# Patient Record
Sex: MALE | Race: WHITE | Employment: FULL TIME | URBAN - METROPOLITAN AREA
[De-identification: names, ages, dates, MRNs, and addresses within clinical notes are randomized per-mention and may not be internally consistent; named-entity substitution may affect disease eponyms.]

---

## 2017-09-11 DIAGNOSIS — J45.30 MILD PERSISTENT ASTHMA WITHOUT COMPLICATION: ICD-10-CM

## 2017-09-11 NOTE — TELEPHONE ENCOUNTER
RN spoke with patient. Follow up scheduled for 9/12/17. He will wait to have inhaler filled, in case changes are made to his medications. Closing encounter.      Rajni Goldstein RN

## 2017-09-11 NOTE — TELEPHONE ENCOUNTER
fluticasone-salmeterol (ADVAIR DISKUS) 100-50 MCG/DOSE diskus inhaler       Last Written Prescription Date: 7/19/16  Last Fill Quantity: 1, # refills: 11    Last Office Visit with [facility], [facility] or [facility] prescribing provider:  7/19/16   Future Office Visit:       Date of Last Asthma Action Plan Letter:   Asthma Action Plan Q1 Year    Topic Date Due     Asthma Action Plan - yearly  07/23/2016      Asthma Control Test:   ACT Total Scores 7/19/2016   ACT TOTAL SCORE -   ASTHMA ER VISITS -   ASTHMA HOSPITALIZATIONS -   ACT TOTAL SCORE (Goal Greater than or Equal to 20) 19   In the past 12 months, how many times did you visit the emergency room for your asthma without being admitted to the hospital? 0   In the past 12 months, how many times were you hospitalized overnight because of your asthma? 0       Date of Last Spirometry Test:   No results found for this or any previous visit.

## 2017-09-12 ENCOUNTER — OFFICE VISIT (OUTPATIENT)
Dept: ALLERGY | Facility: CLINIC | Age: 47
End: 2017-09-12
Payer: COMMERCIAL

## 2017-09-12 VITALS
OXYGEN SATURATION: 96 % | HEART RATE: 76 BPM | DIASTOLIC BLOOD PRESSURE: 80 MMHG | BODY MASS INDEX: 27.69 KG/M2 | HEIGHT: 71 IN | WEIGHT: 197.8 LBS | SYSTOLIC BLOOD PRESSURE: 119 MMHG

## 2017-09-12 DIAGNOSIS — J45.30 MILD PERSISTENT ASTHMA WITHOUT COMPLICATION: Primary | ICD-10-CM

## 2017-09-12 DIAGNOSIS — J30.89 OTHER ALLERGIC RHINITIS: ICD-10-CM

## 2017-09-12 LAB
FEF 25/75: NORMAL
FEV-1: NORMAL
FEV1/FVC: NORMAL
FVC: NORMAL

## 2017-09-12 PROCEDURE — 94010 BREATHING CAPACITY TEST: CPT | Performed by: ALLERGY & IMMUNOLOGY

## 2017-09-12 PROCEDURE — 99213 OFFICE O/P EST LOW 20 MIN: CPT | Mod: 25 | Performed by: ALLERGY & IMMUNOLOGY

## 2017-09-12 RX ORDER — ALBUTEROL SULFATE 90 UG/1
2 AEROSOL, METERED RESPIRATORY (INHALATION) EVERY 4 HOURS PRN
Qty: 1 INHALER | Refills: 3 | Status: SHIPPED | OUTPATIENT
Start: 2017-09-12 | End: 2018-11-27

## 2017-09-12 NOTE — NURSING NOTE
"Chief Complaint   Patient presents with     RECHECK     Followup and refills.        Initial /80  Pulse 76  Ht 1.81 m (5' 11.25\")  Wt 89.7 kg (197 lb 12.8 oz)  SpO2 96%  BMI 27.39 kg/m2 Estimated body mass index is 27.39 kg/(m^2) as calculated from the following:    Height as of this encounter: 1.81 m (5' 11.25\").    Weight as of this encounter: 89.7 kg (197 lb 12.8 oz).  Medication Reconciliation: complete   Dejah Frost MA.... 8:52 AM....9/12/2017      "

## 2017-09-12 NOTE — MR AVS SNAPSHOT
After Visit Summary   9/12/2017    Moreno Elena    MRN: [id number]           Patient Information     Date Of Birth          1970        Visit Information        Provider Department      9/12/2017 9:00 AM Claribel Smith MD [facility]        Today's Diagnoses     Mild persistent asthma without complication    -  1      Care Instructions    If you have any questions regarding your allergies, asthma, or what we discussed during your visit today please call the allergy clinic or contact us via [facility].    [facility]: [phone]      Follow-up in 1 year - sooner if you are having any asthma symptoms or concerns          Follow-ups after your visit        Follow-up notes from your care team     Return in about 1 year (around 9/12/2018).      Who to contact     If you have questions or need follow up information about today's clinic visit or your schedule please contact [facility] directly at [phone].  Normal or non-critical lab and imaging results will be communicated to you by [employer], letter or phone within 4 business days after the clinic has received the results. If you do not hear from us within 7 days, please contact the clinic through [employer] or phone. If you have a critical or abnormal lab result, we will notify you by phone as soon as possible.  Submit refill requests through [facility] or call your pharmacy and they will forward the refill request to us. Please allow 3 business days for your refill to be completed.          Additional Information About Your Visit        [employer] Information     [facility] gives you secure access to your electronic health record. If you see a primary care provider, you can also send messages to your care team and make appointments. If you have questions, please call your primary care clinic.  If you do not have a primary care provider, please call [phone] and they will assist you.        Care EveryWhere ID     This  "is your Care EveryWhere ID. This could be used by other organizations to access your [facility] medical records  [id number]        Your Vitals Were     Pulse Height Pulse Oximetry BMI (Body Mass Index)          76 1.81 m (5' 11.25\") 96% 27.39 kg/m2         Blood Pressure from Last 3 Encounters:   09/12/17 119/80   07/19/16 112/80   07/23/15 126/83    Weight from Last 3 Encounters:   09/12/17 89.7 kg (197 lb 12.8 oz)   07/19/16 88.9 kg (196 lb)   07/23/15 87.8 kg (193 lb 9.6 oz)              We Performed the Following     Spirometry, Breathing Capacity          Today's Medication Changes          These changes are accurate as of: 9/12/17  9:17 AM.  If you have any questions, ask your nurse or doctor.               These medicines have changed or have updated prescriptions.        Dose/Directions    albuterol 108 (90 BASE) MCG/ACT Inhaler   Commonly known as:  PROAIR HFA/PROVENTIL HFA/VENTOLIN HFA   This may have changed:  reasons to take this   Used for:  Mild persistent asthma without complication   Changed by:  Claribel Smith MD        Dose:  2 puff   Inhale 2 puffs into the lungs every 4 hours as needed (cough.)   Quantity:  1 Inhaler   Refills:  3            Where to get your medicines      These medications were sent to [place] [address] [address]     Phone:  [phone]     albuterol 108 (90 BASE) MCG/ACT Inhaler    fluticasone-salmeterol 100-50 MCG/DOSE diskus inhaler                Primary Care Provider Office Phone # Fax #    Watson Mayer [phone] [address] [facility] [address]        Equal Access to Services     MARTHA MCMILLAN : Marcos Marsh, waanny lujosefina, qaybta kaalmameche eckert, pablito pinto. So [facility] [phone].    ATENCIÓN: Si habla español, tiene a andrew disposición servicios gratuitos de asistencia lingüística. Llame al [phone].    We " comply with applicable federal civil rights laws and [place] laws. We do not discriminate on the basis of race, color, national origin, age, disability sex, sexual orientation or gender identity.            Thank you!     Thank you for choosing [facility]  for your care. Our goal is always to provide you with excellent care. Hearing back from our patients is one way we can continue to improve our services. Please take a few minutes to complete the written survey that you may receive in the mail after your visit with us. Thank you!             Your Updated Medication List - Protect others around you: Learn how to safely use, store and throw away your medicines at www.disposemymeds.org.          This list is accurate as of: 9/12/17  9:17 AM.  Always use your most recent med list.                   Brand Name Dispense Instructions for use Diagnosis    AEROCHAMBER MV Misc     1 each    Use as directed with albuterol inhaler    Mild persistent asthma without complication       albuterol 108 (90 BASE) MCG/ACT Inhaler    PROAIR HFA/PROVENTIL HFA/VENTOLIN HFA    1 Inhaler    Inhale 2 puffs into the lungs every 4 hours as needed (cough.)    Mild persistent asthma without complication       fexofenadine 180 MG tablet    ALLEGRA    90 tablet    Take 1 tablet by mouth daily.    Seasonal allergic rhinitis       fluticasone-salmeterol 100-50 MCG/DOSE diskus inhaler    ADVAIR DISKUS    3 Inhaler    Inhale 1 puff into the lungs 2 times daily    Mild persistent asthma without complication       simvastatin 40 MG tablet    ZOCOR    90 tablet    Take 1 tablet by mouth At Bedtime.    Hyperlipidemia LDL goal <160       valACYclovir 1000 mg tablet    VALTREX    20 tablet    Take  by mouth. take 2 tabs q 12 hours for 2 total doses at the onset of cold sore symptoms    Recurrent herpes labialis

## 2017-09-12 NOTE — PROGRESS NOTES
Moreno Elena was seen in the Allergy Clinic at [facility]. The following are my recommendations regarding his Mild Persistent Asthma and Allergic Rhinitis    1. Continue advair 100/50mcg, 1 puff twice daily  2. Continue albuterol HFA, 2-4 puffs every 4 hours as needed  3. Continue fexofenadine 180mg daily  4. Follow-up in 1 year, sooner if needed      Moreno Elena is a 46 year old American male who is seen today for follow-up of asthma. He reports that 2 weeks ago he ran out of his advair and has begun to notice a return of his asthma symptoms. Moreno has had to use his albuterol inhaler a couple of times in the past 2 weeks. When he is regularly taking his advair he feels his asthma is well controlled. He denies nocturnal asthma symptoms or limitations in his activity. Moreno has not had any asthma exacerbations or need for prednisone in the last year.    Moreno states that his allergy symptoms have been well controlled with daily allegra. He has no questions or concerns regarding his allergic rhinitis or asthma today.      REVIEW OF SYSTEMS:  General: negative for weight gain. negative for weight loss. negative for changes in sleep.   Eyes: positive  for itching. positive  for redness. positive  for tearing/watering.  Ears: negative for fullness. negative for hearing loss. negative for dizziness.   Nose: negative for snoring.negative for changes in smell. negative for drainage.   Throat: negative for hoarseness. negative for sore throat. negative for trouble swallowing.   Lungs: negative for shortness of breath.negative for wheezing. negative for sputum production.   Cardiovascular: negative for chest pain. negative for swelling of ankles. negative for fast or irregular heartbeat.   Gastrointestinal: negative for nausea. negative for heartburn. positive  for acid reflux.   Musculoskeletal: negative for joint pain. negative for joint stiffness. negative for joint swelling.   Neurologic: negative for  "seizures. negative for fainting. negative for weakness.   Psychiatric: negative for changes in mood. negative for anxiety.   Endocrine: negative for cold intolerance. negative for heat intolerance. negative for tremors.   Hematologic: negative for easy bruising. negative for easy bleeding.  Integumentary: negative for rash. negative for scaling. negative for nail changes.       Current Outpatient Prescriptions:      fluticasone-salmeterol (ADVAIR DISKUS) 100-50 MCG/DOSE diskus inhaler, Inhale 1 puff into the lungs 2 times daily, Disp: 1 Inhaler, Rfl: 11     albuterol (PROAIR HFA, PROVENTIL HFA, VENTOLIN HFA) 108 (90 BASE) MCG/ACT inhaler, Inhale 2 puffs into the lungs every 4 hours as needed for shortness of breath / dyspnea or wheezing (cough.), Disp: 1 Inhaler, Rfl: 3     Spacer/Aero-Holding Chambers (AEROCHAMBER MV) MISC, Use as directed with albuterol inhaler, Disp: 1 each, Rfl: 0     fexofenadine (ALLEGRA) 180 MG tablet, Take 1 tablet by mouth daily., Disp: 90 tablet, Rfl: 3     valACYclovir (VALTREX) 1000 mg tablet, Take  by mouth. take 2 tabs q 12 hours for 2 total doses at the onset of cold sore symptoms (Patient not taking: Reported on 9/12/2017), Disp: 20 tablet, Rfl: 1     simvastatin (ZOCOR) 40 MG tablet, Take 1 tablet by mouth At Bedtime. (Patient not taking: Reported on 9/12/2017), Disp: 90 tablet, Rfl: 3    EXAM:   /80  Pulse 76  Ht 1.81 m (5' 11.25\")  Wt 89.7 kg (197 lb 12.8 oz)  SpO2 96%  BMI 27.39 kg/m2  GENERAL APPEARANCE: alert, cooperative and not in distress  SKIN: no rashes, no lesions  HEAD: atraumatic, normocephalic  ENT: no scars or lesions, nasal exam showed no discharge, swelling or lesions noted, tongue midline and normal, soft palate, uvula, and tonsils normal  NECK: no asymmetry, masses, or scars, supple without significant adenopathy  LUNGS: unlabored respirations, no intercostal retractions or accessory muscle use, clear to auscultation without rales or wheezes  HEART: " regular rate and rhythm without murmurs and normal S1 and S2  MUSCULOSKELETAL: no musculoskeletal defects are noted  NEURO: no focal deficits noted  PSYCH: does not appear depressed or anxious      WORKUP:  Spirometry  SPIROMETRY       FVC 4.94L (93% of predicted).     FEV1 4.30L (104% of predicted).     FEV1/FVC 87%     FEF 25%-75%  5.16L/s (140% of predicted).    These values are consistent with normal lung function without evidence of airflow obstruction.    Asthma Control Test (ACT) total score: 20     ASSESSMENT/PLAN:  Moreno Elena is a 46 year old male here for follow-up of asthma and allergic rhinitis. He states that his symptoms have been well controlled and he has no questions or concerns today. Spirometry obtained today revealed normal lung function.    1. Continue advair 100/50mcg, 1 puff twice daily  2. Continue albuterol HFA, 2-4 puffs every 4 hours as needed  3. Continue fexofenadine 180mg daily  4. Follow-up in 1 year, sooner if needed      Claribel Smith MD  Allergy/Immunology  [place], [place]      Chart documentation done in part with Dragon Voice Recognition Software. Although reviewed after completion, some word and grammatical errors may remain.

## 2017-09-12 NOTE — PATIENT INSTRUCTIONS
If you have any questions regarding your allergies, asthma, or what we discussed during your visit today please call the allergy clinic or contact us via [employer].    [facility]: [phone]      Follow-up in 1 year - sooner if you are having any asthma symptoms or concerns

## 2017-09-13 ASSESSMENT — ASTHMA QUESTIONNAIRES: ACT_TOTALSCORE: 20

## 2018-10-04 ENCOUNTER — TELEPHONE (OUTPATIENT)
Dept: ALLERGY | Facility: CLINIC | Age: 48
End: 2018-10-04

## 2018-10-04 DIAGNOSIS — J45.30 MILD PERSISTENT ASTHMA WITHOUT COMPLICATION: ICD-10-CM

## 2018-10-04 NOTE — TELEPHONE ENCOUNTER
"Pending Prescriptions:                       Disp   Refills    fluticasone-salmeterol (ADVAIR DISKUS) 10*3 Inha*3            Sig: Inhale 1 puff into the lungs 2 times daily    Failed [facility] refill protocol, see below:    Requested Prescriptions   Pending Prescriptions Disp Refills     fluticasone-salmeterol (ADVAIR DISKUS) 100-50 MCG/DOSE diskus inhaler 3 Inhaler 3     Sig: Inhale 1 puff into the lungs 2 times daily    Inhaled Steroids Protocol Failed    10/4/2018 11:26 AM       Failed - Asthma control assessment score within normal limits in last 6 months    Please review ACT score.          Failed - Recent (6 mo) or future (30 days) visit within the authorizing provider's specialty    Patient had office visit in the last 6 months or has a visit in the next 30 days with authorizing provider or within the authorizing provider's specialty.  See \"Patient Info\" tab in inbasket, or \"Choose Columns\" in Meds & Orders section of the refill encounter.           Passed - Patient is age 12 or older        Routing refill request to provider for review/approval because:  Last ACT was 20 on 9/12/2017    Rajni Goldstein RN    "

## 2018-10-05 NOTE — TELEPHONE ENCOUNTER
Called patient and left message that prescription was called in and he will need an appointment before the next refill. Lmom to call to make appt with Dr. Simth

## 2018-11-27 ENCOUNTER — OFFICE VISIT (OUTPATIENT)
Dept: ALLERGY | Facility: CLINIC | Age: 48
End: 2018-11-27
Payer: COMMERCIAL

## 2018-11-27 VITALS
BODY MASS INDEX: 26.63 KG/M2 | TEMPERATURE: 97 F | WEIGHT: 190.2 LBS | HEIGHT: 71 IN | HEART RATE: 59 BPM | OXYGEN SATURATION: 97 % | SYSTOLIC BLOOD PRESSURE: 129 MMHG | DIASTOLIC BLOOD PRESSURE: 86 MMHG

## 2018-11-27 DIAGNOSIS — J45.30 MILD PERSISTENT ASTHMA WITHOUT COMPLICATION: Primary | ICD-10-CM

## 2018-11-27 DIAGNOSIS — Z23 NEED FOR PROPHYLACTIC VACCINATION AND INOCULATION AGAINST INFLUENZA: ICD-10-CM

## 2018-11-27 DIAGNOSIS — J30.89 OTHER ALLERGIC RHINITIS: ICD-10-CM

## 2018-11-27 LAB
FEF 25/75: NORMAL
FEV-1: NORMAL
FEV1/FVC: NORMAL
FVC: NORMAL

## 2018-11-27 PROCEDURE — 94010 BREATHING CAPACITY TEST: CPT | Performed by: ALLERGY & IMMUNOLOGY

## 2018-11-27 PROCEDURE — 90471 IMMUNIZATION ADMIN: CPT | Performed by: ALLERGY & IMMUNOLOGY

## 2018-11-27 PROCEDURE — 99213 OFFICE O/P EST LOW 20 MIN: CPT | Mod: 25 | Performed by: ALLERGY & IMMUNOLOGY

## 2018-11-27 PROCEDURE — 90682 RIV4 VACC RECOMBINANT DNA IM: CPT | Performed by: ALLERGY & IMMUNOLOGY

## 2018-11-27 RX ORDER — ALBUTEROL SULFATE 90 UG/1
2 AEROSOL, METERED RESPIRATORY (INHALATION) EVERY 4 HOURS PRN
Qty: 1 INHALER | Refills: 3 | Status: SHIPPED | OUTPATIENT
Start: 2018-11-27 | End: 2019-10-29

## 2018-11-27 NOTE — LETTER
"    11/27/2018         RE: Moreno Elena  [address] [facility] [address]        Dear Colleague,    Thank you for referring your patient, Moreno Elena, to the [facility]. Please see a copy of my visit note below.    Moreno Elena was seen in the Allergy Clinic at [facility]. The following are my recommendations regarding his Mild Persistent Asthma and Allergic Rhinitis    1. Continue advair 100/50mcg, 1 puff twice daily  2. Use albuterol HFA, 2-4 puffs every 4 hours as needed  3. Continue fexofenadine 180mg daily  4. Influenza vaccine given in clinic today  5. Follow-up in 1 year      Moreno Elena is a 48 year old American male who is seen today for follow-up of asthma and allergic rhinitis. He states that his asthma has been well controlled with his current medications. He is taking advair 100/50mcg 1 puff twice daily and uses albuterol as needed. Moreno notes that if he misses several doses of the advair he will start to have increased asthma symptoms. In general he does not need to use albuterol more than twice per week and typically uses it about once or twice per month. He uses it more in the winter time particularly if he will be exercising outdoors. Moreno denies having nocturnal asthma symptoms or limitations in activity. He has not had any exacerbations or need for prednisone in the last year.    Moreno continues to take allegra daily for his allergy symptoms and reports they have been well controlled with antihistamines.      Past Medical History:   Diagnosis Date     Allergic rhinitis due to animal dander      exercise induced asthma 2005     House dust mite allergy      Mild persistent asthma      Obstructive sleep apnea     nasal CPAP--not doing x \"yrs\"     Seasonal allergic rhinitis '95 skin tested in [place]     allergic to \"everything\" per pt.       REVIEW OF SYSTEMS:  General: negative for weight gain. negative for weight loss. negative for changes in sleep. "   Eyes: negative for itching. negative for redness. negative for tearing/watering. negative for vision changes  Ears: negative for fullness. negative for hearing loss. negative for dizziness.   Nose: negative for snoring.negative for changes in smell. negative for drainage.   Throat: negative for hoarseness. negative for sore throat. negative for trouble swallowing.   Lungs: negative for cough. negative for shortness of breath.negative for wheezing. negative for sputum production.   Cardiovascular: negative for chest pain. negative for swelling of ankles. negative for fast or irregular heartbeat.   Gastrointestinal: negative for nausea. negative for heartburn. negative for acid reflux.   Musculoskeletal: negative for joint pain. negative for joint stiffness. negative for joint swelling.   Neurologic: negative for seizures. negative for fainting. negative for weakness.   Psychiatric: negative for changes in mood. negative for anxiety.   Endocrine: negative for cold intolerance. negative for heat intolerance. negative for tremors.   Hematologic: negative for easy bruising. negative for easy bleeding.  Integumentary: negative for rash. negative for scaling. negative for nail changes.       Current Outpatient Prescriptions:      albuterol (PROAIR HFA/PROVENTIL HFA/VENTOLIN HFA) 108 (90 BASE) MCG/ACT Inhaler, Inhale 2 puffs into the lungs every 4 hours as needed (cough.), Disp: 1 Inhaler, Rfl: 3     fexofenadine (ALLEGRA) 180 MG tablet, Take 1 tablet by mouth daily., Disp: 90 tablet, Rfl: 3     fluticasone-salmeterol (ADVAIR DISKUS) 100-50 MCG/DOSE diskus inhaler, Inhale 1 puff into the lungs 2 times daily Needs appointment for further refills, Disp: 1 Inhaler, Rfl: 0     simvastatin (ZOCOR) 40 MG tablet, Take 1 tablet by mouth At Bedtime. (Patient not taking: Reported on 9/12/2017), Disp: 90 tablet, Rfl: 3     Spacer/Aero-Holding Chambers (AEROCHAMBER MV) MISC, Use as directed with albuterol inhaler, Disp: 1 each, Rfl:  "0     valACYclovir (VALTREX) 1000 mg tablet, Take  by mouth. take 2 tabs q 12 hours for 2 total doses at the onset of cold sore symptoms (Patient not taking: Reported on 9/12/2017), Disp: 20 tablet, Rfl: 1    EXAM:   /86 (BP Location: Left arm, Patient Position: Sitting, Cuff Size: Adult Regular)  Pulse 59  Temp 97  F (36.1  C) (Oral)  Ht 1.791 m (5' 10.5\")  Wt 86.3 kg (190 lb 3.2 oz)  SpO2 97%  BMI 26.91 kg/m2  GENERAL APPEARANCE: alert, cooperative and not in distress  SKIN: no rashes, no lesions  HEAD: atraumatic, normocephalic  ENT: no scars or lesions, nasal exam showed no discharge, swelling or lesions noted, tongue midline and normal, soft palate, uvula, and tonsils normal  NECK: no asymmetry, masses, or scars, supple without significant adenopathy  LUNGS: unlabored respirations, no intercostal retractions or accessory muscle use, clear to auscultation without rales or wheezes  HEART: regular rate and rhythm without murmurs and normal S1 and S2  MUSCULOSKELETAL: no musculoskeletal defects are noted  NEURO: no focal deficits noted  PSYCH: does not appear depressed or anxious      WORKUP:  Spirometry  SPIROMETRY       FVC 4.78L (93% of predicted).     FEV1 4.14L (104% of predicted).     FEV1/FVC 87%     FEF 25%-75%  5.42L/s (154% of predicted).    These values are consistent with normal lung function without evidence of airflow obstruction    Asthma Control Test (ACT) total score: 21     ASSESSMENT/PLAN:  Moreno Eelna is a 48 year old male here for follow-up of asthma and allergic rhinitis. He reports that his asthma has been well controlled with his current medication regimen. Moreno notes that he has had increased asthma symptoms if he misses several doses of the advair and is comfortable with his current dosage. His allergy symptoms have been well controlled with antihistamines. Moreno had no additional questions or concerns today.    1. Continue advair 100/50mcg, 1 puff twice daily  2. Use " albuterol HFA, 2-4 puffs every 4 hours as needed  3. Continue fexofenadine 180mg daily  4. Influenza vaccine given in clinic today  5. Follow-up in 1 year      Claribel Smith MD  Allergy/Immunology  [place], [place]      Chart documentation done in part with Dragon Voice Recognition Software. Although reviewed after completion, some word and grammatical errors may remain.        Injectable Influenza Immunization Documentation    1.  Is the person to be vaccinated sick today?   No    2. Does the person to be vaccinated have an allergy to a component   of the vaccine?   No  [employer] Allergy Algorithm Link    3. Has the person to be vaccinated ever had a serious reaction   to influenza vaccine in the past?   No    4. Has the person to be vaccinated ever had Guillain-Barré syndrome?   No    Form completed by Nely Aburto RN             Again, thank you for allowing me to participate in the care of your patient.        Sincerely,        Claribel Smith MD

## 2018-11-27 NOTE — LETTER
My Asthma Action Plan  Name: Moreno Elena   YOB: 1970  Date: 11/27/2018   My doctor: Claribel Smith MD   My clinic: [facility]        My Control Medicine: Fluticasone + salmeterol (Advair) -  Diskus 100/50 mcg 1 puff twice daily  My Rescue Medicine: Albuterol (Proair/Ventolin/Proventil) inhaler 2-4 puffs every 4 hours as needed   My Asthma Severity: mild persistent  Avoid your asthma triggers: exercise or sports and cold air               GREEN ZONE   Good Control    I feel good    No cough or wheeze    Can work, sleep and play without asthma symptoms       Take your asthma control medicine every day.     1. If exercise triggers your asthma, take your rescue medication    15 minutes before exercise or sports, and    During exercise if you have asthma symptoms  2. Spacer to use with inhaler: If you have a spacer, make sure to use it with your inhaler             YELLOW ZONE Getting Worse  I have ANY of these:    I do not feel good    Cough or wheeze    Chest feels tight    Wake up at night   1. Keep taking your Green Zone medications  2. Start taking your rescue medicine:    every 20 minutes for up to 1 hour. Then every 4 hours for 24-48 hours.  3. If you stay in the Yellow Zone for more than 12-24 hours, contact your doctor.           RED ZONE Medical Alert - Get Help  I have ANY of these:    I feel awful    Medicine is not helping    Breathing getting harder    Trouble walking or talking    Nose opens wide to breathe       1. Take your rescue medicine NOW  2. If your provider has prescribed an oral steroid medicine, start taking it NOW  3. Call your doctor NOW  4. If you are still in the Red Zone after 20 minutes and you have not reached your doctor:    Take your rescue medicine again and    Call 911 or go to the emergency room right away    See your regular doctor within 2 weeks of an Emergency Room or Urgent Care visit for follow-up treatment.          Annual Reminders:  Meet with  Asthma Educator,  Flu Shot in the Fall, consider Pneumonia Vaccination for patients with asthma (aged 19 and older).    Pharmacy:    [facility] - [place], [place] - [address] [facility]  [facility] - [place], [place] - [address] [facility]  WRITTEN PRESCRIPTION REQUESTED                      Asthma Triggers  How To Control Things That Make Your Asthma Worse    Triggers are things that make your asthma worse.  Look at the list below to help you find your triggers and what you can do about them.  You can help prevent asthma flare-ups by staying away from your triggers.      Trigger                                                          What you can do   Cigarette Smoke  Tobacco smoke can make asthma worse. Do not allow smoking in your home, car or around you.  Be sure no one smokes at a child s day care or school.  If you smoke, ask your health care provider for ways to help you quit.  Ask family members to quit too.  Ask your health care provider for a referral to Quit Plan to help you quit smoking, or call 2-016-678-PLAN.     Colds, Flu, Bronchitis  These are common triggers of asthma. Wash your hands often.  Don t touch your eyes, nose or mouth.  Get a flu shot every year.     Dust Mites  These are tiny bugs that live in cloth or carpet. They are too small to see. Wash sheets and blankets in hot water every week.   Encase pillows and mattress in dust mite proof covers.  Avoid having carpet if you can. If you have carpet, vacuum weekly.   Use a dust mask and HEPA vacuum.   Pollen and Outdoor Mold  Some people are allergic to trees, grass, or weed pollen, or molds. Try to keep your windows closed.  Limit time out doors when pollen count is high.   Ask you health care provider about taking medicine during allergy season.     Animal Dander  Some people are allergic to skin flakes, urine or saliva from pets with fur or feathers. Keep pets with fur or feathers out of your home.    If you  can t keep the pet outdoors, then keep the pet out of your bedroom.  Keep the bedroom door closed.  Keep pets off cloth furniture and away from stuffed toys.     Mice, Rats, and Cockroaches  Some people are allergic to the waste from these pests.   Cover food and garbage.  Clean up spills and food crumbs.  Store grease in the refrigerator.   Keep food out of the bedroom.   Indoor Mold  This can be a trigger if your home has high moisture. Fix leaking faucets, pipes, or other sources of water.   Clean moldy surfaces.  Dehumidify basement if it is damp and smelly.   Smoke, Strong Odors, and Sprays  These can reduce air quality. Stay away from strong odors and sprays, such as perfume, powder, hair spray, paints, smoke incense, paint, cleaning products, candles and new carpet.   Exercise or Sports  Some people with asthma have this trigger. Be active!  Ask your doctor about taking medicine before sports or exercise to prevent symptoms.    Warm up for 5-10 minutes before and after sports or exercise.     Other Triggers of Asthma  Cold air:  Cover your nose and mouth with a scarf.  Sometimes laughing or crying can be a trigger.  Some medicines and food can trigger asthma.

## 2018-11-27 NOTE — MR AVS SNAPSHOT
"              After Visit Summary   11/27/2018    Moreno Elena    MRN: [id number]           Patient Information     Date Of Birth          1970        Visit Information        Provider Department      11/27/2018 4:20 PM Claribel Smith MD [facility]        Today's Diagnoses     Mild persistent asthma without complication    -  1    Other allergic rhinitis        Need for prophylactic vaccination and inoculation against influenza           Follow-ups after your visit        Who to contact     If you have questions or need follow up information about today's clinic visit or your schedule please contact [facility] directly at [phone].  Normal or non-critical lab and imaging results will be communicated to you by Metasethart, letter or phone within 4 business days after the clinic has received the results. If you do not hear from us within 7 days, please contact the clinic through Metasethart or phone. If you have a critical or abnormal lab result, we will notify you by phone as soon as possible.  Submit refill requests through [email or web address] or call your pharmacy and they will forward the refill request to us. Please allow 3 business days for your refill to be completed.          Additional Information About Your Visit        MyChart Information     [email or web address] gives you secure access to your electronic health record. If you see a primary care provider, you can also send messages to your care team and make appointments. If you have questions, please call your primary care clinic.  If you do not have a primary care provider, please call [phone] and they will assist you.        Care EveryWhere ID     This is your Care EveryWhere ID. This could be used by other organizations to access your [facility] medical records  [id number]        Your Vitals Were     Pulse Temperature Height Pulse Oximetry BMI (Body Mass Index)       59 97  F (36.1  C) (Oral) 1.791 m (5' 10.5\") 97% 26.91 kg/m2        Blood " Pressure from Last 3 Encounters:   11/27/18 129/86   09/12/17 119/80   07/19/16 112/80    Weight from Last 3 Encounters:   11/27/18 86.3 kg (190 lb 3.2 oz)   09/12/17 89.7 kg (197 lb 12.8 oz)   07/19/16 88.9 kg (196 lb)              We Performed the Following     FLU VACCINE, (RIV4) RECOMBINANT HA  , IM (FluBlok, egg free) [85670]- >18 YRS ([facility] recommended  50-64 YRS)     Spirometry, Breathing Capacity          Today's Medication Changes          These changes are accurate as of 11/27/18  4:48 PM.  If you have any questions, ask your nurse or doctor.               These medicines have changed or have updated prescriptions.        Dose/Directions    albuterol 108 (90 Base) MCG/ACT inhaler   Commonly known as:  PROAIR HFA/PROVENTIL HFA/VENTOLIN HFA   This may have changed:  reasons to take this   Used for:  Mild persistent asthma without complication   Changed by:  Claribel Smith MD        Dose:  2 puff   Inhale 2 puffs into the lungs every 4 hours as needed for shortness of breath / dyspnea or wheezing (cough.)   Quantity:  1 Inhaler   Refills:  3         Stop taking these medicines if you haven't already. Please contact your care team if you have questions.     simvastatin 40 MG tablet   Commonly known as:  ZOCOR   Stopped by:  Claribel Smith MD                Where to get your medicines      These medications were sent to [address] [address]     Phone:  [phone]     albuterol 108 (90 Base) MCG/ACT inhaler    fluticasone-salmeterol 100-50 MCG/DOSE inhaler                Primary Care Provider Office Phone # Fax #    Watson Mayer [phone] [address] [facility] [address]        Equal Access to Services     [facility] DEYANIRA : Marcos Marsh, wapromiseda luqadaha, qaybta kaalpablito betancourt. [facility] [phone].    ATENCIÓN: Si mireya lin  a andrew disposición servicios gratuitos de asistencia lingüística. Kike march [phone].    We comply with applicable federal civil rights laws and [place] laws. We do not discriminate on the basis of race, color, national origin, age, disability, sex, sexual orientation, or gender identity.            Thank you!     Thank you for choosing [facility]  for your care. Our goal is always to provide you with excellent care. Hearing back from our patients is one way we can continue to improve our services. Please take a few minutes to complete the written survey that you may receive in the mail after your visit with us. Thank you!             Your Updated Medication List - Protect others around you: Learn how to safely use, store and throw away your medicines at www.disposemymeds.org.          This list is accurate as of 11/27/18  4:48 PM.  Always use your most recent med list.                   Brand Name Dispense Instructions for use Diagnosis    AEROCHAMBER MV Misc     1 each    Use as directed with albuterol inhaler    Mild persistent asthma without complication       albuterol 108 (90 Base) MCG/ACT inhaler    PROAIR HFA/PROVENTIL HFA/VENTOLIN HFA    1 Inhaler    Inhale 2 puffs into the lungs every 4 hours as needed for shortness of breath / dyspnea or wheezing (cough.)    Mild persistent asthma without complication       fexofenadine 180 MG tablet    ALLEGRA    90 tablet    Take 1 tablet by mouth daily.    Seasonal allergic rhinitis       fluticasone-salmeterol 100-50 MCG/DOSE inhaler    ADVAIR DISKUS    1 Inhaler    Inhale 1 puff into the lungs 2 times daily Needs appointment for further refills    Mild persistent asthma without complication       valACYclovir 1000 mg tablet    VALTREX    20 tablet    Take  by mouth. take 2 tabs q 12 hours for 2 total doses at the onset of cold sore symptoms    Recurrent herpes labialis

## 2018-11-27 NOTE — PROGRESS NOTES

## 2018-11-27 NOTE — PROGRESS NOTES
"Moreno Elena was seen in the Allergy Clinic at [facility]. The following are my recommendations regarding his Mild Persistent Asthma and Allergic Rhinitis    1. Continue advair 100/50mcg, 1 puff twice daily  2. Use albuterol HFA, 2-4 puffs every 4 hours as needed  3. Continue fexofenadine 180mg daily  4. Influenza vaccine given in clinic today  5. Follow-up in 1 year      Moreno Elena is a 48 year old American male who is seen today for follow-up of asthma and allergic rhinitis. He states that his asthma has been well controlled with his current medications. He is taking advair 100/50mcg 1 puff twice daily and uses albuterol as needed. Moreno notes that if he misses several doses of the advair he will start to have increased asthma symptoms. In general he does not need to use albuterol more than twice per week and typically uses it about once or twice per month. He uses it more in the winter time particularly if he will be exercising outdoors. Moreno denies having nocturnal asthma symptoms or limitations in activity. He has not had any exacerbations or need for prednisone in the last year.    Moreno continues to take allegra daily for his allergy symptoms and reports they have been well controlled with antihistamines.      Past Medical History:   Diagnosis Date     Allergic rhinitis due to animal dander      exercise induced asthma 2005     House dust mite allergy      Mild persistent asthma      Obstructive sleep apnea     nasal CPAP--not doing x \"yrs\"     Seasonal allergic rhinitis '95 skin tested in [place]     allergic to \"everything\" per pt.       REVIEW OF SYSTEMS:  General: negative for weight gain. negative for weight loss. negative for changes in sleep.   Eyes: negative for itching. negative for redness. negative for tearing/watering. negative for vision changes  Ears: negative for fullness. negative for hearing loss. negative for dizziness.   Nose: negative for snoring.negative for changes in " smell. negative for drainage.   Throat: negative for hoarseness. negative for sore throat. negative for trouble swallowing.   Lungs: negative for cough. negative for shortness of breath.negative for wheezing. negative for sputum production.   Cardiovascular: negative for chest pain. negative for swelling of ankles. negative for fast or irregular heartbeat.   Gastrointestinal: negative for nausea. negative for heartburn. negative for acid reflux.   Musculoskeletal: negative for joint pain. negative for joint stiffness. negative for joint swelling.   Neurologic: negative for seizures. negative for fainting. negative for weakness.   Psychiatric: negative for changes in mood. negative for anxiety.   Endocrine: negative for cold intolerance. negative for heat intolerance. negative for tremors.   Hematologic: negative for easy bruising. negative for easy bleeding.  Integumentary: negative for rash. negative for scaling. negative for nail changes.       Current Outpatient Prescriptions:      albuterol (PROAIR HFA/PROVENTIL HFA/VENTOLIN HFA) 108 (90 BASE) MCG/ACT Inhaler, Inhale 2 puffs into the lungs every 4 hours as needed (cough.), Disp: 1 Inhaler, Rfl: 3     fexofenadine (ALLEGRA) 180 MG tablet, Take 1 tablet by mouth daily., Disp: 90 tablet, Rfl: 3     fluticasone-salmeterol (ADVAIR DISKUS) 100-50 MCG/DOSE diskus inhaler, Inhale 1 puff into the lungs 2 times daily Needs appointment for further refills, Disp: 1 Inhaler, Rfl: 0     simvastatin (ZOCOR) 40 MG tablet, Take 1 tablet by mouth At Bedtime. (Patient not taking: Reported on 9/12/2017), Disp: 90 tablet, Rfl: 3     Spacer/Aero-Holding Chambers (AEROCHAMBER MV) MISC, Use as directed with albuterol inhaler, Disp: 1 each, Rfl: 0     valACYclovir (VALTREX) 1000 mg tablet, Take  by mouth. take 2 tabs q 12 hours for 2 total doses at the onset of cold sore symptoms (Patient not taking: Reported on 9/12/2017), Disp: 20 tablet, Rfl: 1    EXAM:   /86 (BP Location: Left  "arm, Patient Position: Sitting, Cuff Size: Adult Regular)  Pulse 59  Temp 97  F (36.1  C) (Oral)  Ht 1.791 m (5' 10.5\")  Wt 86.3 kg (190 lb 3.2 oz)  SpO2 97%  BMI 26.91 kg/m2  GENERAL APPEARANCE: alert, cooperative and not in distress  SKIN: no rashes, no lesions  HEAD: atraumatic, normocephalic  ENT: no scars or lesions, nasal exam showed no discharge, swelling or lesions noted, tongue midline and normal, soft palate, uvula, and tonsils normal  NECK: no asymmetry, masses, or scars, supple without significant adenopathy  LUNGS: unlabored respirations, no intercostal retractions or accessory muscle use, clear to auscultation without rales or wheezes  HEART: regular rate and rhythm without murmurs and normal S1 and S2  MUSCULOSKELETAL: no musculoskeletal defects are noted  NEURO: no focal deficits noted  PSYCH: does not appear depressed or anxious      WORKUP:  Spirometry  SPIROMETRY       FVC 4.78L (93% of predicted).     FEV1 4.14L (104% of predicted).     FEV1/FVC 87%     FEF 25%-75%  5.42L/s (154% of predicted).    These values are consistent with normal lung function without evidence of airflow obstruction    Asthma Control Test (ACT) total score: 21     ASSESSMENT/PLAN:  Moreno Elena is a 48 year old male here for follow-up of asthma and allergic rhinitis. He reports that his asthma has been well controlled with his current medication regimen. Moreno notes that he has had increased asthma symptoms if he misses several doses of the advair and is comfortable with his current dosage. His allergy symptoms have been well controlled with antihistamines. Moreno had no additional questions or concerns today.    1. Continue advair 100/50mcg, 1 puff twice daily  2. Use albuterol HFA, 2-4 puffs every 4 hours as needed  3. Continue fexofenadine 180mg daily  4. Influenza vaccine given in clinic today  5. Follow-up in 1 year      Claribel Smith MD  Allergy/Immunology  [facility] and [place], [place]      Chart " documentation done in part with Dragon Voice Recognition Software. Although reviewed after completion, some word and grammatical errors may remain.

## 2018-11-28 ASSESSMENT — ASTHMA QUESTIONNAIRES: ACT_TOTALSCORE: 21

## 2019-10-29 ENCOUNTER — OFFICE VISIT (OUTPATIENT)
Dept: ALLERGY | Facility: CLINIC | Age: 49
End: 2019-10-29
Payer: COMMERCIAL

## 2019-10-29 VITALS
HEART RATE: 50 BPM | DIASTOLIC BLOOD PRESSURE: 85 MMHG | SYSTOLIC BLOOD PRESSURE: 124 MMHG | BODY MASS INDEX: 25.21 KG/M2 | WEIGHT: 178.2 LBS | TEMPERATURE: 97.5 F

## 2019-10-29 DIAGNOSIS — Z23 NEED FOR VACCINATION: ICD-10-CM

## 2019-10-29 DIAGNOSIS — J45.30 MILD PERSISTENT ASTHMA WITHOUT COMPLICATION: Primary | ICD-10-CM

## 2019-10-29 DIAGNOSIS — J30.89 OTHER ALLERGIC RHINITIS: ICD-10-CM

## 2019-10-29 LAB
FEF 25/75: NORMAL
FEV-1: NORMAL
FEV1/FVC: NORMAL
FVC: NORMAL

## 2019-10-29 PROCEDURE — 94010 BREATHING CAPACITY TEST: CPT | Performed by: ALLERGY & IMMUNOLOGY

## 2019-10-29 PROCEDURE — 90732 PPSV23 VACC 2 YRS+ SUBQ/IM: CPT | Performed by: ALLERGY & IMMUNOLOGY

## 2019-10-29 PROCEDURE — 90471 IMMUNIZATION ADMIN: CPT | Performed by: ALLERGY & IMMUNOLOGY

## 2019-10-29 PROCEDURE — 99214 OFFICE O/P EST MOD 30 MIN: CPT | Mod: 25 | Performed by: ALLERGY & IMMUNOLOGY

## 2019-10-29 RX ORDER — ALBUTEROL SULFATE 90 UG/1
2 AEROSOL, METERED RESPIRATORY (INHALATION) EVERY 4 HOURS PRN
Qty: 1 INHALER | Refills: 3 | Status: SHIPPED | OUTPATIENT
Start: 2019-10-29 | End: 2020-11-02

## 2019-10-29 NOTE — LETTER
My Asthma Action Plan    Name: Moreno Elena   YOB: 1970  Date: 10/29/2019   My doctor: Claribel Smith MD   My clinic: [facility]        My Control Medicine: Fluticasone propionate + salmeterol (Advair Diskus or Wixela Inhub) -  100/50 mcg 1 puff twice daily  My Rescue Medicine: Albuterol (Proair/Ventolin/Proventil HFA) 2-4 puffs EVERY 4 HOURS as needed. Use a spacer if recommended by your provider.   My Asthma Severity:   Mild Persistent  Know your asthma triggers: exercise or sports and cold air               GREEN ZONE   Good Control    I feel good    No cough or wheeze    Can work, sleep and play without asthma symptoms       Take your asthma control medicine every day.     1. If exercise triggers your asthma, take your rescue medication    15 minutes before exercise or sports, and    During exercise if you have asthma symptoms  2. Spacer to use with inhaler: If you have a spacer, make sure to use it with your inhaler             YELLOW ZONE Getting Worse  I have ANY of these:    I do not feel good    Cough or wheeze    Chest feels tight    Wake up at night   1. Keep taking your Green Zone medications  2. Start taking your rescue medicine:    every 20 minutes for up to 1 hour. Then every 4 hours for 24-48 hours.  3. If you stay in the Yellow Zone for more than 12-24 hours, contact your doctor.  4. If you do not return to the Green Zone in 12-24 hours or you get worse, start taking your oral steroid medicine if prescribed by your provider.           RED ZONE Medical Alert - Get Help  I have ANY of these:    I feel awful    Medicine is not helping    Breathing getting harder    Trouble walking or talking    Nose opens wide to breathe       1. Take your rescue medicine NOW  2. If your provider has prescribed an oral steroid medicine, start taking it NOW  3. Call your doctor NOW  4. If you are still in the Red Zone after 20 minutes and you have not reached your doctor:    Take your  rescue medicine again and    Call 911 or go to the emergency room right away    See your regular doctor within 2 weeks of an Emergency Room or Urgent Care visit for follow-up treatment.          Annual Reminders:  Meet with Asthma Educator,  Flu Shot in the Fall, consider Pneumonia Vaccination for patients with asthma (aged 19 and older).    Pharmacy:    [facility] - [facility] [address] [facility].   [facility] - [place], [place] - [facility] [facility]  WRITTEN PRESCRIPTION REQUESTED                          Asthma Triggers  How To Control Things That Make Your Asthma Worse    Triggers are things that make your asthma worse.  Look at the list below to help you find your triggers and what you can do about them.  You can help prevent asthma flare-ups by staying away from your triggers.      Trigger                                                          What you can do   Cigarette Smoke  Tobacco smoke can make asthma worse. Do not allow smoking in your home, car or around you.  Be sure no one smokes at a child s day care or school.  If you smoke, ask your health care provider for ways to help you quit.  Ask family members to quit too.  Ask your health care provider for a referral to Quit Plan to help you quit smoking, or call 7-480-422-PLAN.     Colds, Flu, Bronchitis  These are common triggers of asthma. Wash your hands often.  Don t touch your eyes, nose or mouth.  Get a flu shot every year.     Dust Mites  These are tiny bugs that live in cloth or carpet. They are too small to see. Wash sheets and blankets in hot water every week.   Encase pillows and mattress in dust mite proof covers.  Avoid having carpet if you can. If you have carpet, vacuum weekly.   Use a dust mask and HEPA vacuum.   Pollen and Outdoor Mold  Some people are allergic to trees, grass, or weed pollen, or molds. Try to keep your windows closed.  Limit time out doors when pollen count is high.   Ask you  health care provider about taking medicine during allergy season.     Animal Dander  Some people are allergic to skin flakes, urine or saliva from pets with fur or feathers. Keep pets with fur or feathers out of your home.    If you can t keep the pet outdoors, then keep the pet out of your bedroom.  Keep the bedroom door closed.  Keep pets off cloth furniture and away from stuffed toys.     Mice, Rats, and Cockroaches   Some people are allergic to the waste from these pests.   Cover food and garbage.  Clean up spills and food crumbs.  Store grease in the refrigerator.   Keep food out of the bedroom.   Indoor Mold  This can be a trigger if your home has high moisture. Fix leaking faucets, pipes, or other sources of water.   Clean moldy surfaces.  Dehumidify basement if it is damp and smelly.   Smoke, Strong Odors, and Sprays  These can reduce air quality. Stay away from strong odors and sprays, such as perfume, powder, hair spray, paints, smoke incense, paint, cleaning products, candles and new carpet.   Exercise or Sports  Some people with asthma have this trigger. Be active!  Ask your doctor about taking medicine before sports or exercise to prevent symptoms.    Warm up for 5-10 minutes before and after sports or exercise.     Other Triggers of Asthma  Cold air:  Cover your nose and mouth with a scarf.  Sometimes laughing or crying can be a trigger.  Some medicines and food can trigger asthma.

## 2019-10-29 NOTE — NURSING NOTE
Prior to immunization administration, verified patients identity using patient s name and date of birth. Please see Immunization Activity for additional information.     Screening Questionnaire for Adult Immunization    Are you sick today?   No   Do you have allergies to medications, food, a vaccine component or latex?   No   Have you ever had a serious reaction after receiving a vaccination?   No   Do you have a long-term health problem with heart disease, lung disease, asthma, kidney disease, metabolic disease (e.g. diabetes), anemia, or other blood disorder?   Yes   Do you have cancer, leukemia, HIV/AIDS, or any other immune system problem?   No   In the past 3 months, have you taken medications that affect  your immune system, such as prednisone, other steroids, or anticancer drugs; drugs for the treatment of rheumatoid arthritis, Crohn s disease, or psoriasis; or have you had radiation treatments?   No   Have you had a seizure, or a brain or other nervous system problem?   No   During the past year, have you received a transfusion of blood or blood     products, or been given immune (gamma) globulin or antiviral drug?   No   For women: Are you pregnant or is there a chance you could become        pregnant during the next month?   No   Have you received any vaccinations in the past 4 weeks?   No     Immunization questionnaire was positive for at least one answer.  Notified Dr. Smith.        Per orders of Dr. Smith, injection of Pneumovax 23 given by Sally Villela CMA. Patient instructed to remain in clinic for 15 minutes afterwards, and to report any adverse reaction to me immediately.       Screening performed by Sally Villela CMA on 10/29/2019 at 11:11 AM.

## 2019-10-29 NOTE — LETTER
"    10/29/2019         RE: Moreno Elena  [address] [id number]        Dear Colleague,    Thank you for referring your patient, Moreno Elena, to the [facility]. Please see a copy of my visit note below.    Moreno Elena was seen in the Allergy Clinic at [facility]. The following are my recommendations regarding his Mild Persistent Asthma and Allergic Rhinitis    1. Continue Advair 100/50mcg, 1 puff twice daily  2. Take 2 to 4 puffs of albuterol HFA every 4 hours as needed. May also take 2 puffs 15 minutes prior to exercise.  3. Continue fexofenadine 180mg daily  4. Pneumococcal vaccine given in clinic today  5. Follow-up in 1 year      Moreno Elena is a 48 year old American male who is seen today for follow-up of asthma and allergic rhinitis. He states that he has been doing well and has had no issues with his asthma over the past year. He continues to take Advair though often only takes it once daily. He has noticed if he stops the Advair altogether that his asthma symptoms will return. Moreno has rarely needed to use his albuterol inhaler and denies having nocturnal asthma symptoms or limitations in his activity. He does pre-medicate with albuterol prior to exercise, particularly if exercising outdoors in the winter.     Moreno continues to take allegra daily for rhinitis symptoms. He feels his symptoms have been well controlled with this regimen.      Past Medical History:   Diagnosis Date     Allergic rhinitis due to animal dander      exercise induced asthma 2005     House dust mite allergy      Mild persistent asthma      Obstructive sleep apnea     nasal CPAP--not doing x \"yrs\"     Seasonal allergic rhinitis '95 skin tested in [place]     allergic to \"everything\" per pt.     Family History   Problem Relation Age of Onset     Cancer Father         throat     Social History     Tobacco Use     Smoking status: Never Smoker     Smokeless tobacco: Never Used "   Substance Use Topics     Alcohol use: Yes     Comment: rarely     Drug use: No       Past medical, family, and social history were reviewed.    REVIEW OF SYSTEMS:  General: negative for weight gain. negative for weight loss. negative for changes in sleep.   Eyes: negative for itching. negative for redness. negative for tearing/watering. negative for vision changes  Ears: negative for fullness. negative for hearing loss. negative for dizziness.   Nose: negative for snoring.negative for changes in smell. negative for drainage.   Throat: negative for hoarseness. negative for sore throat. negative for trouble swallowing.   Lungs: negative for cough. negative for shortness of breath.negative for wheezing. negative for sputum production.   Cardiovascular: negative for chest pain. negative for swelling of ankles. negative for fast or irregular heartbeat.   Gastrointestinal: negative for nausea. negative for heartburn. negative for acid reflux.   Musculoskeletal: negative for joint pain. negative for joint stiffness. negative for joint swelling.   Neurologic: negative for seizures. negative for fainting. negative for weakness.   Psychiatric: negative for changes in mood. negative for anxiety.   Endocrine: negative for cold intolerance. negative for heat intolerance. negative for tremors.   Hematologic: negative for easy bruising. negative for easy bleeding.  Integumentary: negative for rash. negative for scaling. negative for nail changes.       Current Outpatient Medications:      albuterol (PROAIR HFA/PROVENTIL HFA/VENTOLIN HFA) 108 (90 Base) MCG/ACT inhaler, Inhale 2 puffs into the lungs every 4 hours as needed for shortness of breath / dyspnea or wheezing (cough.), Disp: 1 Inhaler, Rfl: 3     fexofenadine (ALLEGRA) 180 MG tablet, Take 1 tablet by mouth daily., Disp: 90 tablet, Rfl: 3     fluticasone-salmeterol (ADVAIR DISKUS) 100-50 MCG/DOSE inhaler, Inhale 1 puff into the lungs 2 times daily Needs appointment for  further refills, Disp: 1 Inhaler, Rfl: 11     Spacer/Aero-Holding Chambers (AEROCHAMBER MV) MISC, Use as directed with albuterol inhaler, Disp: 1 each, Rfl: 0     valACYclovir (VALTREX) 1000 mg tablet, Take  by mouth. take 2 tabs q 12 hours for 2 total doses at the onset of cold sore symptoms (Patient not taking: Reported on 9/12/2017), Disp: 20 tablet, Rfl: 1    EXAM:   /85 (BP Location: Left arm, Patient Position: Sitting, Cuff Size: Adult Regular)   Pulse 50   Temp 97.5  F (36.4  C) (Oral)   Wt 80.8 kg (178 lb 3.2 oz)   BMI 25.21 kg/m     GENERAL APPEARANCE: alert, cooperative and not in distress  SKIN: no rashes, no lesions  HEAD: atraumatic, normocephalic  EYES: lids and lashes normal, conjunctivae and sclerae clear  ENT: no scars or lesions, tongue midline and normal, soft palate, uvula, and tonsils normal  NECK: no asymmetry, masses, or scars, supple without significant adenopathy  LUNGS: unlabored respirations, no intercostal retractions or accessory muscle use, clear to auscultation without rales or wheezes  HEART: regular rate and rhythm without murmurs and normal S1 and S2  MUSCULOSKELETAL: no musculoskeletal defects are noted  NEURO: no focal deficits noted  PSYCH: does not appear depressed or anxious      WORKUP:  Spirometry    SPIROMETRY       FVC 4.78L (93% of predicted).     FEV1 4.18L (105% of predicted).     FEV1/FVC 87%      I have reviewed and interpreted these results. These values and flow volume loop are consistent with normal lung function    Asthma Control Test (ACT) total score: 23     ASSESSMENT/PLAN:  Moreno Elena is a 48 year old male here for follow-up of asthma and allergic rhinitis. His asthma has been well controlled with daily Advair and he has had no exacerbations or need for prednisone in the past year. When attempting to wean off of Advair or step-down therapy in the past his asthma symptoms have returned and Moreno wishes to continue with his current medication  regimen.    1. Continue Advair 100/50mcg, 1 puff twice daily  2. Take 2 to 4 puffs of albuterol HFA every 4 hours as needed. May also take 2 puffs 15 minutes prior to exercise.  3. Continue fexofenadine 180mg daily  4. Pneumococcal vaccine given in clinic today  5. Follow-up in 1 year      Thank you for allowing me to participate in the care of Moreno Elena.      Claribel Smith MD  Allergy/Immunology  [place], [place]      Chart documentation done in part with Dragon Voice Recognition Software. Although reviewed after completion, some word and grammatical errors may remain.    Again, thank you for allowing me to participate in the care of your patient.        Sincerely,        Claribel Smith MD

## 2019-10-29 NOTE — PROGRESS NOTES
"Moreno Elena was seen in the Allergy Clinic at [facility]. The following are my recommendations regarding his Mild Persistent Asthma and Allergic Rhinitis    1. Continue Advair 100/50mcg, 1 puff twice daily  2. Take 2 to 4 puffs of albuterol HFA every 4 hours as needed. May also take 2 puffs 15 minutes prior to exercise.  3. Continue fexofenadine 180mg daily  4. Pneumococcal vaccine given in clinic today  5. Follow-up in 1 year      Moreno Elena is a 48 year old American male who is seen today for follow-up of asthma and allergic rhinitis. He states that he has been doing well and has had no issues with his asthma over the past year. He continues to take Advair though often only takes it once daily. He has noticed if he stops the Advair altogether that his asthma symptoms will return. Moreno has rarely needed to use his albuterol inhaler and denies having nocturnal asthma symptoms or limitations in his activity. He does pre-medicate with albuterol prior to exercise, particularly if exercising outdoors in the winter.     Moreno continues to take allegra daily for rhinitis symptoms. He feels his symptoms have been well controlled with this regimen.      Past Medical History:   Diagnosis Date     Allergic rhinitis due to animal dander      exercise induced asthma 2005     House dust mite allergy      Mild persistent asthma      Obstructive sleep apnea     nasal CPAP--not doing x \"yrs\"     Seasonal allergic rhinitis '95 skin tested in [place]     allergic to \"everything\" per pt.     Family History   Problem Relation Age of Onset     Cancer Father         throat     Social History     Tobacco Use     Smoking status: Never Smoker     Smokeless tobacco: Never Used   Substance Use Topics     Alcohol use: Yes     Comment: rarely     Drug use: No       Past medical, family, and social history were reviewed.    REVIEW OF SYSTEMS:  General: negative for weight gain. negative for weight loss. negative for changes in " sleep.   Eyes: negative for itching. negative for redness. negative for tearing/watering. negative for vision changes  Ears: negative for fullness. negative for hearing loss. negative for dizziness.   Nose: negative for snoring.negative for changes in smell. negative for drainage.   Throat: negative for hoarseness. negative for sore throat. negative for trouble swallowing.   Lungs: negative for cough. negative for shortness of breath.negative for wheezing. negative for sputum production.   Cardiovascular: negative for chest pain. negative for swelling of ankles. negative for fast or irregular heartbeat.   Gastrointestinal: negative for nausea. negative for heartburn. negative for acid reflux.   Musculoskeletal: negative for joint pain. negative for joint stiffness. negative for joint swelling.   Neurologic: negative for seizures. negative for fainting. negative for weakness.   Psychiatric: negative for changes in mood. negative for anxiety.   Endocrine: negative for cold intolerance. negative for heat intolerance. negative for tremors.   Hematologic: negative for easy bruising. negative for easy bleeding.  Integumentary: negative for rash. negative for scaling. negative for nail changes.       Current Outpatient Medications:      albuterol (PROAIR HFA/PROVENTIL HFA/VENTOLIN HFA) 108 (90 Base) MCG/ACT inhaler, Inhale 2 puffs into the lungs every 4 hours as needed for shortness of breath / dyspnea or wheezing (cough.), Disp: 1 Inhaler, Rfl: 3     fexofenadine (ALLEGRA) 180 MG tablet, Take 1 tablet by mouth daily., Disp: 90 tablet, Rfl: 3     fluticasone-salmeterol (ADVAIR DISKUS) 100-50 MCG/DOSE inhaler, Inhale 1 puff into the lungs 2 times daily Needs appointment for further refills, Disp: 1 Inhaler, Rfl: 11     Spacer/Aero-Holding Chambers (AEROCHAMBER MV) MISC, Use as directed with albuterol inhaler, Disp: 1 each, Rfl: 0     valACYclovir (VALTREX) 1000 mg tablet, Take  by mouth. take 2 tabs q 12 hours for 2 total  doses at the onset of cold sore symptoms (Patient not taking: Reported on 9/12/2017), Disp: 20 tablet, Rfl: 1    EXAM:   /85 (BP Location: Left arm, Patient Position: Sitting, Cuff Size: Adult Regular)   Pulse 50   Temp 97.5  F (36.4  C) (Oral)   Wt 80.8 kg (178 lb 3.2 oz)   BMI 25.21 kg/m    GENERAL APPEARANCE: alert, cooperative and not in distress  SKIN: no rashes, no lesions  HEAD: atraumatic, normocephalic  EYES: lids and lashes normal, conjunctivae and sclerae clear  ENT: no scars or lesions, tongue midline and normal, soft palate, uvula, and tonsils normal  NECK: no asymmetry, masses, or scars, supple without significant adenopathy  LUNGS: unlabored respirations, no intercostal retractions or accessory muscle use, clear to auscultation without rales or wheezes  HEART: regular rate and rhythm without murmurs and normal S1 and S2  MUSCULOSKELETAL: no musculoskeletal defects are noted  NEURO: no focal deficits noted  PSYCH: does not appear depressed or anxious      WORKUP:  Spirometry    SPIROMETRY       FVC 4.78L (93% of predicted).     FEV1 4.18L (105% of predicted).     FEV1/FVC 87%      I have reviewed and interpreted these results. These values and flow volume loop are consistent with normal lung function    Asthma Control Test (ACT) total score: 23     ASSESSMENT/PLAN:  Moreno Elena is a 48 year old male here for follow-up of asthma and allergic rhinitis. His asthma has been well controlled with daily Advair and he has had no exacerbations or need for prednisone in the past year. When attempting to wean off of Advair or step-down therapy in the past his asthma symptoms have returned and Moreno wishes to continue with his current medication regimen.    1. Continue Advair 100/50mcg, 1 puff twice daily  2. Take 2 to 4 puffs of albuterol HFA every 4 hours as needed. May also take 2 puffs 15 minutes prior to exercise.  3. Continue fexofenadine 180mg daily  4. Pneumococcal vaccine given in clinic  today  5. Follow-up in 1 year      Thank you for allowing me to participate in the care of Morenorylie Brunoe.      Claribel Smith MD  Allergy/Immunology  [facility] and [place], [place]      Chart documentation done in part with Dragon Voice Recognition Software. Although reviewed after completion, some word and grammatical errors may remain.

## 2019-10-30 ASSESSMENT — ASTHMA QUESTIONNAIRES: ACT_TOTALSCORE: 23

## 2019-10-31 ENCOUNTER — TELEPHONE (OUTPATIENT)
Dept: ALLERGY | Facility: CLINIC | Age: 49
End: 2019-10-31

## 2019-10-31 NOTE — TELEPHONE ENCOUNTER
Prior Authorization Retail Medication Request    Medication/Dose: albuterol (PROAIR HFA/PROVENTIL HFA/VENTOLIN HFA) 108 (90 Base) MCG/ACT inhaler  ICD code (if different than what is on RX):    Previously Tried and Failed:   Rationale:  insurance does not cover medication     Insurance Name:  [email or web address] OPEN ACCESS  Insurance ID:  [id number]      Pharmacy Information (if different than what is on RX)  Name:  [facility]  Phone: [phone]

## 2019-11-01 NOTE — TELEPHONE ENCOUNTER
Central Prior Authorization Team   Phone: [phone]    Prior Authorization Not Needed per Insurance    Medication: albuterol (PROAIR HFA/PROVENTIL HFA/VENTOLIN HFA) 108 (90 Base) MCG/ACT inhaler  Insurance Company: [employer] - Phone [phone] Fax [phone]  Expected CoPay:      Pharmacy Filling the Rx: [facility] T[facility] - [place], [place] - [address] [address]  Pharmacy Notified: Yes  Patient Notified: Yes    Ventolin HFA is Pending sale to [facility] preferred albuterol HFA inhaler and does not require prior authorization for coverage. Pharmacy received paid claim, no PA needed.

## 2019-11-05 ENCOUNTER — HEALTH MAINTENANCE LETTER (OUTPATIENT)
Age: 49
End: 2019-11-05

## 2020-10-19 ENCOUNTER — VIRTUAL VISIT (OUTPATIENT)
Dept: FAMILY MEDICINE | Facility: OTHER | Age: 50
End: 2020-10-19
Payer: COMMERCIAL

## 2020-10-19 PROCEDURE — 99421 OL DIG E/M SVC 5-10 MIN: CPT | Performed by: PHYSICIAN ASSISTANT

## 2020-10-19 NOTE — PROGRESS NOTES
"Date: 10/19/2020 17:49:20  Clinician: Sunny Whatley  Clinician NPI: [id number]  Patient: Moreno Elena  Patient : 1970  Patient Address: [address] [place] [address]  Patient Phone: [phone]  Visit Protocol: URI  Patient Summary:  Moreno is a 49 year old ( : 1970 ) male who initiated a [facility] Visit for COVID-19 (Coronavirus) evaluation and screening. When asked the question \"Please sign me up to receive news, health information and promotions from [facility].\", Moreno responded \"No\".    Moreno states his symptoms started today.   His symptoms consist of a cough, nasal congestion, myalgia, malaise, and a sore throat.   Symptom details     Nasal secretions: The color of his mucus is yellow.    Cough: Moreno coughs a few times an hour and his cough is not more bothersome at night. Phlegm comes into his throat when he coughs. He does not believe his cough is caused by post-nasal drip. The color of the phlegm is yellow.     Sore throat: Moreno reports having mild throat pain (1-3 on a 10 point pain scale), does not have exudate on his tonsils, and can swallow liquids. The lymph nodes in his neck are not enlarged. A rash has not appeared on the skin since the sore throat started.      Moreno denies having ear pain, headache, wheezing, fever, enlarged lymph nodes, anosmia, vomiting, rhinitis, nausea, facial pain or pressure, chills, teeth pain, ageusia, and diarrhea. He also denies taking antibiotic medication in the past month and having recent facial or sinus surgery in the past 60 days. He is not experiencing dyspnea.   Precipitating events  Within the past week, Moreno has not been exposed to someone with strep throat. He has not recently been exposed to someone with influenza. Moreno has been in close contact with the following high risk individuals: immunocompromised people.   Pertinent COVID-19 (Coronavirus) information  In the past 14 days, Moreno has not worked in a congregate living " setting.   He does not work or volunteer as healthcare worker or a  and does not work or volunteer in a healthcare facility.   Moreno also has not lived in a congregate living setting in the past 14 days. He does not live with a healthcare worker.   Moreno has not had a close contact with a laboratory-confirmed COVID-19 patient within 14 days of symptom onset.   Since December 2019, Moreno and has not had upper respiratory infection or influenza-like illness. Has not been diagnosed with lab-confirmed COVID-19 test   Pertinent medical history  Moreno needs a return to work/school note.   Weight: 175 lbs   Moreno does not smoke or use smokeless tobacco.   Weight: 175 lbs    MEDICATIONS: Advair Diskus inhalation, ALLERGIES: NKDA  Clinician Response:  Dear Moreno,   Your symptoms show that you may have coronavirus (COVID-19). This illness can cause fever, cough and trouble breathing. Many people get a mild case and get better on their own. Some people can get very sick.  What should I do?  We would like to test you for this virus.   1. Please call [phone] to schedule your visit. Explain that you were referred by [facility] to have a COVID-19 test. Be ready to share your [facility] visit ID number.  The following will serve as your written order for this COVID Test, ordered by me, for the indication of suspected COVID [Z20.828]: The test will be ordered in [employer], our electronic health record, after you are scheduled. It will show as ordered and authorized by Jude Trejo MD.  Order: COVID-19 (Coronavirus) PCR for SYMPTOMATIC testing from [facility].      2. When it's time for your COVID test:  Stay at least 6 feet away from others. (If someone will drive you to your test, stay in the backseat, as far away from the  as you can.)   Cover your mouth and nose with a mask, tissue or washcloth.  Go straight to the testing site. Don't make any stops on the way there or back.      3.Starting now: Stay home and away from  "others (self-isolate) until:   You've had no fever---and no medicine that reduces fever---for one full day (24 hours). And...   Your other symptoms have gotten better. For example, your cough or breathing has improved. And...   At least 10 days have passed since your symptoms started.       During this time, don't leave the house except for testing or medical care.   Stay in your own room, even for meals. Use your own bathroom if you can.   Stay away from others in your home. No hugging, kissing or shaking hands. No visitors.  Don't go to work, school or anywhere else.    Clean \"high touch\" surfaces often (doorknobs, counters, handles, etc.). Use a household cleaning spray or wipes. You'll find a full list of  on the EPA website: www.epa.gov/pesticide-registration/list-n-disinfectants-use-against-sars-cov-2.   Cover your mouth and nose with a mask, tissue or washcloth to avoid spreading germs.  Wash your hands and face often. Use soap and water.  Caregivers in these groups are at risk for severe illness due to COVID-19:  o People 65 years and older  o People who live in a nursing home or long-term care facility  o People with chronic disease (lung, heart, cancer, diabetes, kidney, liver, immunologic)  o People who have a weakened immune system, including those who:   Are in cancer treatment  Take medicine that weakens the immune system, such as corticosteroids  Had a bone marrow or organ transplant  Have an immune deficiency  Have poorly controlled HIV or AIDS  Are obese (body mass index of 40 or higher)  Smoke regularly   o Caregivers should wear gloves while washing dishes, handling laundry and cleaning bedrooms and bathrooms.  o Use caution when washing and drying laundry: Don't shake dirty laundry, and use the warmest water setting that you can.  o For more tips, go to www.cdc.gov/coronavirus/2019-ncov/downloads/10Things.pdf.    How can I take care of myself?    Get lots of rest. Drink extra fluids " (unless a doctor has told you not to).   Take Tylenol (acetaminophen) for fever or pain. If you have liver or kidney problems, ask your family doctor if it's okay to take Tylenol.   Adults can take either:    650 mg (two 325 mg pills) every 4 to 6 hours, or...   1,000 mg (two 500 mg pills) every 8 hours as needed.    Note: Don't take more than 3,000 mg in one day. Acetaminophen is found in many medicines (both prescribed and over-the-counter medicines). Read all labels to be sure you don't take too much.   For children, check the Tylenol bottle for the right dose. The dose is based on the child's age or weight.    If you have other health problems (like cancer, heart failure, an organ transplant or severe kidney disease): Call your specialty clinic if you don't feel better in the next 2 days.       Know when to call 911. Emergency warning signs include:    Trouble breathing or shortness of breath Pain or pressure in the chest that doesn't go away Feeling confused like you haven't felt before, or not being able to wake up Bluish-colored lips or face.  Where can I get more information?   [facility] -- About COVID-19: [email or web address]   CDC -- What to Do If You're Sick: www.cdc.gov/coronavirus/2019-ncov/about/steps-when-sick.html   CDC -- Ending Home Isolation: www.cdc.gov/coronavirus/2019-ncov/hcp/disposition-in-home-patients.html   CDC -- Caring for Someone: www.cdc.gov/coronavirus/2019-ncov/if-you-are-sick/care-for-someone.html   [facility] -- Interim Guidance for Hospital Discharge to Home: www.health.[facility].[place]/diseases/coronavirus/hcp/hospdischarge.pdf   [facility] clinical trials (COVID-19 research studies): clinicalaffairs.[facility]/umn-clinical-trials    Below are the COVID-19 hotlines at the [place] Department of Health ([facility]). Interpreters are available.    For health questions: Call 419-540-9725 or 1-532.971.8213 (7 a.m. to 7 p.m.) For questions about schools and childcare: Call  333-522-8371 or 1-932.217.2417 (7 a.m. to 7 p.m.)    Diagnosis: Contact with and (suspected) exposure to other viral communicable diseases  Diagnosis ICD: Z20.828

## 2020-10-21 DIAGNOSIS — Z20.822 SUSPECTED 2019 NOVEL CORONAVIRUS INFECTION: Primary | ICD-10-CM

## 2020-10-22 DIAGNOSIS — Z20.822 SUSPECTED 2019 NOVEL CORONAVIRUS INFECTION: ICD-10-CM

## 2020-10-22 PROCEDURE — U0003 INFECTIOUS AGENT DETECTION BY NUCLEIC ACID (DNA OR RNA); SEVERE ACUTE RESPIRATORY SYNDROME CORONAVIRUS 2 (SARS-COV-2) (CORONAVIRUS DISEASE [COVID-19]), AMPLIFIED PROBE TECHNIQUE, MAKING USE OF HIGH THROUGHPUT TECHNOLOGIES AS DESCRIBED BY CMS-2020-01-R: HCPCS | Performed by: FAMILY MEDICINE

## 2020-10-23 LAB
SARS-COV-2 RNA SPEC QL NAA+PROBE: ABNORMAL
SPECIMEN SOURCE: ABNORMAL

## 2020-10-24 ENCOUNTER — TELEPHONE (OUTPATIENT)
Dept: EMERGENCY MEDICINE | Facility: CLINIC | Age: 50
End: 2020-10-24

## 2020-10-24 NOTE — TELEPHONE ENCOUNTER
"Coronavirus (COVID-19) Notification    Caller Name (Patient, parent, daughter/son, grandparent, etc)  Patient     Reason for call  Notify of Positive Coronavirus (COVID-19) lab results, assess symptoms,  review [facility] recommendations    Lab Result    Lab test:  2019-nCoV rRt-PCR or SARS-CoV-2 PCR    Oropharyngeal AND/OR nasopharyngeal swabs is POSITIVE for 2019-nCoV RNA/SARS-COV-2 PCR (COVID-19 virus)    RN Recommendations/Instructions per [facility] Coronavirus COVID-19 recommendations    Brief introduction script  Introduce self then review script:  \"I am calling on behalf of [employer].  We were notified that your Coronavirus test (COVID-19) for was POSITIVE for the virus.  I have some information to relay to you but first I wanted to mention that the [place] Dept of Health will be contacting you shortly [it's possible MD already called Patient] to talk to you more about how you are feeling and other people you have had contact with who might now also have the virus.  Also, [facility] is Partnering with the [facility] for Covid-19 research, you may be contacted directly by research staff.\"    Assessment (Inquire about Patient's current symptoms)   Assessment   Current Symptoms at time of phone call: (if no symptoms, document No symptoms]  none    Symptoms onset (if applicable)  10/20/20     If at time of call, Patients symptoms hare worsened, the Patient should contact 911 or have someone drive them to Emergency Dept promptly:      If Patient calling 911, inform 911 personal that you have tested positive for the Coronavirus (COVID-19).  Place mask on and await 911 to arrive.    If Emergency Dept, If possible, please have another adult drive you to the Emergency Dept but you need to wear mask when in contact with other people.      Review information with Patient    Your result was positive. This means you have COVID-19 (coronavirus).  We have sent you a letter that reviews the " information that I'll be reviewing with you now.    How can I protect others?    If you have symptoms: stay home and away from others (self-isolate) until:    You've had no fever--and no medicine that reduces fever--for 1 full day (24 hours). And       Your other symptoms have gotten better. For example, your cough or breathing has improved. And     At least 10 days have passed since your symptoms started. (If you've been told by a doctor that you have a weak immune system, wait 20 days.)     If you don't have symptoms: Stay home and away from others (self-isolate) until at least 10 days have passed since your first positive COVID-19 test. (Date test collected)    During this time:    Stay in your own room, including for meals. Use your own bathroom if you can.    Stay away from others in your home. No hugging, kissing or shaking hands. No visitors.     Don't go to work, school or anywhere else.     Clean  high touch  surfaces often (doorknobs, counters, handles, etc.). Use a household cleaning spray or wipes. You'll find a full list on the EPA website at www.epa.gov/pesticide-registration/list-n-disinfectants-use-against-sars-cov-2.     Cover your mouth and nose with a mask, tissue or other face covering to avoid spreading germs.    Wash your hands and face often with soap and water.    Caregivers in these groups are at risk for severe illness due to COVID-19:  o People 65 years and older  o People who live in a nursing home or long-term care facility  o People with chronic disease (lung, heart, cancer, diabetes, kidney, liver, immunologic)  o People who have a weakened immune system, including those who:  - Are in cancer treatment  - Take medicine that weakens the immune system, such as corticosteroids  - Had a bone marrow or organ transplant  - Have an immune deficiency  - Have poorly controlled HIV or AIDS  - Are obese (body mass index of 40 or higher)  - Smoke regularly    Caregivers should wear gloves while  washing dishes, handling laundry and cleaning bedrooms and bathrooms.    Wash and dry laundry with special caution. Don't shake dirty laundry, and use the warmest water setting you can.    If you have a weakened immune system, ask your doctor about other actions you should take.    For more tips, go to www.cdc.gov/coronavirus/2019-ncov/downloads/10Things.pdf.    You should not go back to work until you meet the guidelines above for ending your home isolation. You don't need to be retested for COVID-19 before going back to work--studies show that you won't spread the virus if it's been at least 10 days since your symptoms started (or 20 days, if you have a weak immune system).    Employers: This document serves as formal notice of your employee's medical guidelines for going back to work. They must meet the above guidelines before going back to work in person.    How can I take care of myself?    1. Get lots of rest. Drink extra fluids (unless a doctor has told you not to).    2. Take Tylenol (acetaminophen) for fever or pain. If you have liver or kidney problems, ask your family doctor if it's okay to take Tylenol.     Take either:     650 mg (two 325 mg pills) every 4 to 6 hours, or     1,000 mg (two 500 mg pills) every 8 hours as needed.     Note: Don't take more than 3,000 mg in one day. Acetaminophen is found in many medicines (both prescribed and over-the-counter medicines). Read all labels to be sure you don't take too much.    For children, check the Tylenol bottle for the right dose (based on their age or weight).    3. If you have other health problems (like cancer, heart failure, an organ transplant or severe kidney disease): Call your specialty clinic if you don't feel better in the next 2 days.    4. Know when to call 911: Emergency warning signs include:    Trouble breathing or shortness of breath    Pain or pressure in the chest that doesn't go away    Feeling confused like you haven't felt before, or  not being able to wake up    Bluish-colored lips or face    5. Sign up for [employer]. We know it's scary to hear that you have COVID-19. We want to track your symptoms to make sure you're okay over the next 2 weeks. Please look for an email from [employer]--this is a free, online program that we'll use to keep in touch. To sign up, follow the link in the email. Learn more at [email or web address].    Where can I get more information?    [facility]: [email or web address]    Coronavirus Basics: www.health.[facility].[place]./diseases/coronavirus/basics.html    What to Do If You're Sick: www.cdc.gov/coronavirus/2019-ncov/about/steps-when-sick.html    Ending Home Isolation: www.cdc.gov/coronavirus/2019-ncov/hcp/disposition-in-home-patients.html     Caring for Someone with COVID-19: www.cdc.gov/coronavirus/2019-ncov/if-you-are-sick/care-for-someone.html     [facility] clinical trials (COVID-19 research studies): clinicalaffairs.[facility]-clinical-trials     A Positive COVID-19 letter will be sent via [employer] or the mail. (Exception, no letters sent to Presurgerical/Preprocedure Patients)    [Name]  Pauline Mccann RN  [facility] [employer]  COVID19 Results Team RN  Ph# [phone]

## 2020-10-28 DIAGNOSIS — Z20.822 SUSPECTED 2019 NOVEL CORONAVIRUS INFECTION: Primary | ICD-10-CM

## 2020-10-28 PROCEDURE — U0003 INFECTIOUS AGENT DETECTION BY NUCLEIC ACID (DNA OR RNA); SEVERE ACUTE RESPIRATORY SYNDROME CORONAVIRUS 2 (SARS-COV-2) (CORONAVIRUS DISEASE [COVID-19]), AMPLIFIED PROBE TECHNIQUE, MAKING USE OF HIGH THROUGHPUT TECHNOLOGIES AS DESCRIBED BY CMS-2020-01-R: HCPCS | Performed by: FAMILY MEDICINE

## 2020-10-29 LAB
SARS-COV-2 RNA SPEC QL NAA+PROBE: ABNORMAL
SPECIMEN SOURCE: ABNORMAL

## 2020-11-02 ENCOUNTER — VIRTUAL VISIT (OUTPATIENT)
Dept: ALLERGY | Facility: CLINIC | Age: 50
End: 2020-11-02
Payer: COMMERCIAL

## 2020-11-02 VITALS — BODY MASS INDEX: 24.47 KG/M2 | WEIGHT: 173 LBS

## 2020-11-02 DIAGNOSIS — U07.1 INFECTION DUE TO 2019 NOVEL CORONAVIRUS: Primary | ICD-10-CM

## 2020-11-02 DIAGNOSIS — J45.30 MILD PERSISTENT ASTHMA WITHOUT COMPLICATION: ICD-10-CM

## 2020-11-02 PROCEDURE — 99213 OFFICE O/P EST LOW 20 MIN: CPT | Mod: TEL | Performed by: ALLERGY & IMMUNOLOGY

## 2020-11-02 RX ORDER — ALBUTEROL SULFATE 90 UG/1
2 AEROSOL, METERED RESPIRATORY (INHALATION) EVERY 4 HOURS PRN
Qty: 1 INHALER | Refills: 3 | Status: SHIPPED | OUTPATIENT
Start: 2020-11-02 | End: 2022-01-24

## 2020-11-02 NOTE — PROGRESS NOTES
"Moreno Elena is a 50 year old male who is being evaluated via a billable telephone visit.      The patient has been notified of following:     \"This telephone visit will be conducted via a call between you and your physician/provider. We have found that certain health care needs can be provided without the need for a physical exam.  This service lets us provide the care you need with a short phone conversation.  If a prescription is necessary we can send it directly to your pharmacy.  If lab work is needed we can place an order for that and you can then stop by our lab to have the test done at a later time.    Telephone visits are billed at different rates depending on your insurance coverage. During this emergency period, for some insurers they may be billed the same as an in-person visit.  Please reach out to your insurance provider with any questions.    If during the course of the call the physician/provider feels a telephone visit is not appropriate, you will not be charged for this service.\"    Patient has given verbal consent for Telephone visit?  Yes    What phone number would you like to be contacted at? [phone]    How would you like to obtain your AVS? Farhad    Phone call duration: 6 minutes, Start 11:55 AM, End 12:01 PM      Moreno Elena was seen in the Allergy Clinic at [facility].      Moreno Elena is a 50 year old American male who is seen today for follow-up of asthma. He states that he was recently diagnosed with COVID-19 infection. His symptoms were fairly mild and it felt like a typical respiratory infection with symptoms of cough and fatigue. He feels he has recovered and his energy levels are back to normal. Moreno states that he has otherwise done well in the past year. He continues to exercise regularly and has been healthy. He has had no asthma exacerbations, prednisone use, or ED visits in the last year. He continues to take Advair twice daily and has not had " "any side effects from the medication. Moreno reports that he infrequently needs to use his albuterol inhaler. He may take it as a precaution if he feels some chest tightness. He has been using albuterol more frequently in the last couple of weeks       Past Medical History:   Diagnosis Date     Allergic rhinitis due to animal dander      exercise induced asthma 2005     House dust mite allergy      Mild persistent asthma      Obstructive sleep apnea     nasal CPAP--not doing x \"yrs\"     Seasonal allergic rhinitis '95 skin tested in [place]     allergic to \"everything\" per pt.     Family History   Problem Relation Age of Onset     Cancer Father         throat     Social History     Tobacco Use     Smoking status: Never Smoker     Smokeless tobacco: Never Used   Substance Use Topics     Alcohol use: Yes     Comment: rarely     Drug use: No     Social History     Social History Narrative    ENVIRONMENTAL HISTORY: The family lives in a 10-15 year [place] The home is heated with a forced air. They do have central air conditioning. The patient's bedroom is furnished with carpeting in bedroom, allergen mattress cover and allergen pillowcase cover.  Pets inside the house include 3 cat(s). There is no history of cockroach or mice infestation. There is/are 0 smokers in the house.  The house does not have a damp basement. 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       Past medical, family, and social history were reviewed.    REVIEW OF SYSTEMS:  General: negative for weight gain. negative for weight loss. negative for changes in sleep.   Eyes: negative for itching. negative for redness. negative for tearing/watering. negative for vision changes  Ears: negative for fullness. negative for hearing loss. negative for dizziness.   Nose: negative for snoring.negative for changes in " smell. negative for drainage.   Throat: negative for hoarseness. negative for sore throat. negative for trouble swallowing.   Lungs: negative for cough. negative for shortness of breath.negative for wheezing. negative for sputum production.   Cardiovascular: negative for chest pain. negative for swelling of ankles. negative for fast or irregular heartbeat.   Gastrointestinal: negative for nausea. negative for heartburn. negative for acid reflux.   Musculoskeletal: negative for joint pain. negative for joint stiffness. negative for joint swelling.   Neurologic: negative for seizures. negative for fainting. negative for weakness.   Psychiatric: negative for changes in mood. negative for anxiety.   Endocrine: negative for cold intolerance. negative for heat intolerance. negative for tremors.   Hematologic: negative for easy bruising. negative for easy bleeding.  Integumentary: negative for rash. negative for scaling. negative for nail changes.       Current Outpatient Medications:      albuterol (PROAIR HFA/PROVENTIL HFA/VENTOLIN HFA) 108 (90 Base) MCG/ACT inhaler, Inhale 2 puffs into the lungs every 4 hours as needed for shortness of breath / dyspnea or wheezing (cough.), Disp: 1 Inhaler, Rfl: 3     fexofenadine (ALLEGRA) 180 MG tablet, Take 1 tablet by mouth daily., Disp: 90 tablet, Rfl: 3     fluticasone-salmeterol (ADVAIR DISKUS) 100-50 MCG/DOSE inhaler, Inhale 1 puff into the lungs 2 times daily, Disp: 1 Inhaler, Rfl: 11     valACYclovir (VALTREX) 1000 mg tablet, Take  by mouth. take 2 tabs q 12 hours for 2 total doses at the onset of cold sore symptoms (Patient not taking: Reported on 9/12/2017), Disp: 20 tablet, Rfl: 1  No Known Allergies    EXAM:   Wt 78.5 kg (173 lb)   BMI 24.47 kg/m    GENERAL: alert, cooperative and not in distress  LUNGS: speaking comfortably in full sentences, no cough or audible wheezing  NEURO: oriented for age x3  PSYCH: does not appear depressed or anxious      WORKUP:   None    ASSESSMENT/PLAN:  Moreno Elena is a 50 year old male seen for follow-up of asthma.    1. Mild persistent asthma without complication - Well controlled, no exacerbations or oral steroid use in the last 12 month.    - fluticasone-salmeterol (ADVAIR DISKUS) 100-50 MCG/DOSE inhaler; Inhale 1 puff into the lungs 2 times daily  Dispense: 1 Inhaler; Refill: 11  - albuterol (PROAIR HFA/PROVENTIL HFA/VENTOLIN HFA) 108 (90 Base) MCG/ACT inhaler; Inhale 2 puffs into the lungs every 4 hours as needed for shortness of breath / dyspnea or wheezing (cough.)  Dispense: 1 Inhaler; Refill: 3    2. Infection due to 2019 novel coronavirus - Moreno was recently diagnosed with COVID-19 and is recovering. He did not require hospitalization for his symptoms.      Follow-up in 1 year, sooner if needed      Thank you for allowing me to participate in the care of Moreno Elena.      Claribel Smith MD, FAAAAI  Allergy/Immunology  [facility]      Chart documentation done in part with Dragon Voice Recognition Software. Although reviewed after completion, some word and grammatical errors may remain.

## 2020-11-02 NOTE — LETTER
"    11/2/2020         RE: Moreno Elena  [id number] [address] [id number]        Dear Colleague,    Thank you for referring your patient, Moreno Elena, to the [facility]. Please see a copy of my visit note below.    Moreno Elena is a 50 year old male who is being evaluated via a billable telephone visit.      The patient has been notified of following:     \"This telephone visit will be conducted via a call between you and your physician/provider. We have found that certain health care needs can be provided without the need for a physical exam.  This service lets us provide the care you need with a short phone conversation.  If a prescription is necessary we can send it directly to your pharmacy.  If lab work is needed we can place an order for that and you can then stop by our lab to have the test done at a later time.    Telephone visits are billed at different rates depending on your insurance coverage. During this emergency period, for some insurers they may be billed the same as an in-person visit.  Please reach out to your insurance provider with any questions.    If during the course of the call the physician/provider feels a telephone visit is not appropriate, you will not be charged for this service.\"    Patient has given verbal consent for Telephone visit?  Yes    What phone number would you like to be contacted at? [phone]    How would you like to obtain your AVS? [employer]    Phone call duration: 6 minutes, Start 11:55 AM, End 12:01 PM      Moreno Elena was seen in the Allergy Clinic at [facility].      Moreno Elena is a 50 year old American male who is seen today for follow-up of asthma. He states that he was recently diagnosed with COVID-19 infection. His symptoms were fairly mild and it felt like a typical respiratory infection with symptoms of cough and fatigue. He feels he has recovered and his energy levels are back to normal. Moreno " "states that he has otherwise done well in the past year. He continues to exercise regularly and has been healthy. He has had no asthma exacerbations, prednisone use, or ED visits in the last year. He continues to take Advair twice daily and has not had any side effects from the medication. Moreno reports that he infrequently needs to use his albuterol inhaler. He may take it as a precaution if he feels some chest tightness. He has been using albuterol more frequently in the last couple of weeks       Past Medical History:   Diagnosis Date     Allergic rhinitis due to animal dander      exercise induced asthma 2005     House dust mite allergy      Mild persistent asthma      Obstructive sleep apnea     nasal CPAP--not doing x \"yrs\"     Seasonal allergic rhinitis '95 skin tested in [place]     allergic to \"everything\" per pt.     Family History   Problem Relation Age of Onset     Cancer Father         throat     Social History     Tobacco Use     Smoking status: Never Smoker     Smokeless tobacco: Never Used   Substance Use Topics     Alcohol use: Yes     Comment: rarely     Drug use: No     Social History     Social History Narrative    ENVIRONMENTAL HISTORY: The family lives in a 10[place]15 year old [place]. The home is heated with a forced air. They do have central air conditioning. The patient's bedroom is furnished with carpeting in bedroom, allergen mattress cover and allergen pillowcase cover.  Pets inside the house include 3 cat(s). There is no history of cockroach or mice infestation. There is/are 0 smokers in the house.  The house does not have a damp basement. 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       Past medical, family, and social history were reviewed.    REVIEW OF SYSTEMS:  General: negative for weight gain. negative for weight loss. negative for changes " in sleep.   Eyes: negative for itching. negative for redness. negative for tearing/watering. negative for vision changes  Ears: negative for fullness. negative for hearing loss. negative for dizziness.   Nose: negative for snoring.negative for changes in smell. negative for drainage.   Throat: negative for hoarseness. negative for sore throat. negative for trouble swallowing.   Lungs: negative for cough. negative for shortness of breath.negative for wheezing. negative for sputum production.   Cardiovascular: negative for chest pain. negative for swelling of ankles. negative for fast or irregular heartbeat.   Gastrointestinal: negative for nausea. negative for heartburn. negative for acid reflux.   Musculoskeletal: negative for joint pain. negative for joint stiffness. negative for joint swelling.   Neurologic: negative for seizures. negative for fainting. negative for weakness.   Psychiatric: negative for changes in mood. negative for anxiety.   Endocrine: negative for cold intolerance. negative for heat intolerance. negative for tremors.   Hematologic: negative for easy bruising. negative for easy bleeding.  Integumentary: negative for rash. negative for scaling. negative for nail changes.       Current Outpatient Medications:      albuterol (PROAIR HFA/PROVENTIL HFA/VENTOLIN HFA) 108 (90 Base) MCG/ACT inhaler, Inhale 2 puffs into the lungs every 4 hours as needed for shortness of breath / dyspnea or wheezing (cough.), Disp: 1 Inhaler, Rfl: 3     fexofenadine (ALLEGRA) 180 MG tablet, Take 1 tablet by mouth daily., Disp: 90 tablet, Rfl: 3     fluticasone-salmeterol (ADVAIR DISKUS) 100-50 MCG/DOSE inhaler, Inhale 1 puff into the lungs 2 times daily, Disp: 1 Inhaler, Rfl: 11     valACYclovir (VALTREX) 1000 mg tablet, Take  by mouth. take 2 tabs q 12 hours for 2 total doses at the onset of cold sore symptoms (Patient not taking: Reported on 9/12/2017), Disp: 20 tablet, Rfl: 1  No Known Allergies    EXAM:   Wt 78.5 kg  (173 lb)   BMI 24.47 kg/m    GENERAL: alert, cooperative and not in distress  LUNGS: speaking comfortably in full sentences, no cough or audible wheezing  NEURO: oriented for age x3  PSYCH: does not appear depressed or anxious      WORKUP:  None    ASSESSMENT/PLAN:  Moreno Elena is a 50 year old male seen for follow-up of asthma.    1. Mild persistent asthma without complication - Well controlled, no exacerbations or oral steroid use in the last 12 month.    - fluticasone-salmeterol (ADVAIR DISKUS) 100-50 MCG/DOSE inhaler; Inhale 1 puff into the lungs 2 times daily  Dispense: 1 Inhaler; Refill: 11  - albuterol (PROAIR HFA/PROVENTIL HFA/VENTOLIN HFA) 108 (90 Base) MCG/ACT inhaler; Inhale 2 puffs into the lungs every 4 hours as needed for shortness of breath / dyspnea or wheezing (cough.)  Dispense: 1 Inhaler; Refill: 3    2. Infection due to 2019 novel coronavirus - Moreno was recently diagnosed with COVID-19 and is recovering. He did not require hospitalization for his symptoms.      Follow-up in 1 year, sooner if needed      Thank you for allowing me to participate in the care of Moreno Elena.      Claribel Smith MD, FAAAAI  Allergy/Immunology  [facility]      Chart documentation done in part with Dragon Voice Recognition Software. Although reviewed after completion, some word and grammatical errors may remain.      Again, thank you for allowing me to participate in the care of your patient.        Sincerely,        Claribel Smith MD

## 2020-11-22 ENCOUNTER — HEALTH MAINTENANCE LETTER (OUTPATIENT)
Age: 50
End: 2020-11-22

## 2021-09-19 ENCOUNTER — HEALTH MAINTENANCE LETTER (OUTPATIENT)
Age: 51
End: 2021-09-19

## 2021-12-13 DIAGNOSIS — J45.30 MILD PERSISTENT ASTHMA WITHOUT COMPLICATION: ICD-10-CM

## 2021-12-13 NOTE — TELEPHONE ENCOUNTER
"Requested Prescriptions   Pending Prescriptions Disp Refills     fluticasone-salmeterol (ADVAIR DISKUS) 100-50 MCG/DOSE inhaler 60 each 0     Sig: Inhale 1 puff into the lungs 2 times daily       Inhaled Steroids Protocol Failed - 12/13/2021  3:43 PM        Failed - Asthma control assessment score within normal limits in last 6 months     Please review ACT score.           Failed - Recent (6 mo) or future (30 days) visit within the authorizing provider's specialty     Patient had office visit in the last 6 months or has a visit in the next 30 days with authorizing provider or within the authorizing provider's specialty.  See \"Patient Info\" tab in inbasket, or \"Choose Columns\" in Meds & Orders section of the refill encounter.            Passed - Patient is age 12 or older        Passed - Medication is active on med list       Long-Acting Beta Agonist Inhalers Protocol  Failed - 12/13/2021  3:43 PM        Failed - Asthma control assessment score within normal limits in last 6 months     Please review ACT score.           Failed - Recent (6 mo) or future (30 days) visit within the authorizing provider's specialty     Patient had office visit in the last 6 months or has a visit in the next 30 days with authorizing provider or within the authorizing provider's specialty.  See \"Patient Info\" tab in inbasket, or \"Choose Columns\" in Meds & Orders section of the refill encounter.            Passed - Patient is age 12 or older        Passed - Order for Serevent, Striverdi, or Foradil and pt has steroid inhaler        Passed - Medication is active on med list           Routing refill request to provider for review/approval because:  Patient needs to be seen because it has been more than 1 year since last office visit.  Last ACT=23 on 10/29/19    HEMANT FernandesN, RN    "

## 2021-12-13 NOTE — TELEPHONE ENCOUNTER
Rx filled x 60 day supply. Patient is overdue for a follow-up visit and will need to be seen for additional refills.

## 2021-12-14 ENCOUNTER — MYC MEDICAL ADVICE (OUTPATIENT)
Dept: ALLERGY | Facility: CLINIC | Age: 51
End: 2021-12-14
Payer: COMMERCIAL

## 2021-12-14 NOTE — TELEPHONE ENCOUNTER
[employer] message sent to patient to advise of refills as well as need for follow up.  Sally GARCIA MA

## 2022-01-09 ENCOUNTER — HEALTH MAINTENANCE LETTER (OUTPATIENT)
Age: 52
End: 2022-01-09

## 2022-01-24 ENCOUNTER — OFFICE VISIT (OUTPATIENT)
Dept: ALLERGY | Facility: CLINIC | Age: 52
End: 2022-01-24
Payer: COMMERCIAL

## 2022-01-24 VITALS — OXYGEN SATURATION: 96 % | SYSTOLIC BLOOD PRESSURE: 141 MMHG | DIASTOLIC BLOOD PRESSURE: 91 MMHG | HEART RATE: 56 BPM

## 2022-01-24 DIAGNOSIS — J45.30 MILD PERSISTENT ASTHMA WITHOUT COMPLICATION: ICD-10-CM

## 2022-01-24 PROCEDURE — 99213 OFFICE O/P EST LOW 20 MIN: CPT | Performed by: ALLERGY & IMMUNOLOGY

## 2022-01-24 RX ORDER — ALBUTEROL SULFATE 90 UG/1
2 AEROSOL, METERED RESPIRATORY (INHALATION) EVERY 4 HOURS PRN
Qty: 18 G | Refills: 2 | Status: SHIPPED | OUTPATIENT
Start: 2022-01-24 | End: 2024-07-16

## 2022-01-24 ASSESSMENT — ASTHMA QUESTIONNAIRES: ACT_TOTALSCORE: 23

## 2022-01-24 NOTE — PROGRESS NOTES
"Moreno Elena was seen in the Allergy Clinic at [facility].      Moreno Elena is a 51 year old American male who is seen today for a follow-up visit. He reports that he has been doing well. He states it has been a \"boring\" health year. He has not had any asthma exacerbations or other health issues in the past year. He continues to take low dose Advair twice daily and reports no side effects from this medication. He has rarely needed to use his albuterol inhaler - primarily if he is going to be exercising in cold weather.       Past Medical History:   Diagnosis Date     Allergic rhinitis due to animal dander      exercise induced asthma 2005     House dust mite allergy      Mild persistent asthma      Obstructive sleep apnea     nasal CPAP--not doing x \"yrs\"     Seasonal allergic rhinitis '95 skin [place]     allergic to \"everything\" per pt.     Family History   Problem Relation Age of Onset     Cancer Father         throat     Social History     Tobacco Use     Smoking status: Never Smoker     Smokeless tobacco: Never Used   Substance Use Topics     Alcohol use: Yes     Comment: rarely     Drug use: No     Social History     Social History Narrative    ENVIRONMENTAL HISTORY: The family lives in a 10-15 year old [place] The home is heated with a forced air. They do have central air conditioning. The patient's bedroom is furnished with carpeting in bedroom, allergen mattress cover and allergen pillowcase cover.  Pets inside the house include 3 cat(s). There is no history of cockroach or mice infestation. There is/are 0 smokers in the house.  The house does not have a damp basement. 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       Past medical, family, and social history were reviewed.    REVIEW OF SYSTEMS:  General: negative for weight " gain. negative for weight loss. negative for changes in sleep.   Eyes: negative for itching. negative for redness. negative for tearing/watering. negative for vision changes  Ears: negative for fullness. negative for hearing loss. negative for dizziness.   Nose: negative for snoring.negative for changes in smell. negative for drainage.   Throat: negative for hoarseness. negative for sore throat. negative for trouble swallowing.   Lungs: negative for cough. negative for shortness of breath.negative for wheezing. negative for sputum production.   Cardiovascular: negative for chest pain. negative for swelling of ankles. negative for fast or irregular heartbeat.   Gastrointestinal: negative for nausea. negative for heartburn. negative for acid reflux.   Musculoskeletal: negative for joint pain. negative for joint stiffness. negative for joint swelling.   Neurologic: negative for seizures. negative for fainting. negative for weakness.   Psychiatric: negative for changes in mood. negative for anxiety.   Endocrine: negative for cold intolerance. negative for heat intolerance. negative for tremors.   Hematologic: negative for easy bruising. negative for easy bleeding.  Integumentary: negative for rash. negative for scaling. negative for nail changes.       Current Outpatient Medications:      albuterol (PROAIR HFA/PROVENTIL HFA/VENTOLIN HFA) 108 (90 Base) MCG/ACT inhaler, Inhale 2 puffs into the lungs every 4 hours as needed for shortness of breath / dyspnea or wheezing (cough.), Disp: 1 Inhaler, Rfl: 3     fexofenadine (ALLEGRA) 180 MG tablet, Take 1 tablet by mouth daily., Disp: 90 tablet, Rfl: 3     fluticasone-salmeterol (ADVAIR DISKUS) 100-50 MCG/DOSE inhaler, Inhale 1 puff into the lungs 2 times daily Needs appointment for additional refills, Disp: 1 each, Rfl: 1     valACYclovir (VALTREX) 1000 mg tablet, Take  by mouth. take 2 tabs q 12 hours for 2 total doses at the onset of cold sore symptoms (Patient not taking:  Reported on 9/12/2017), Disp: 20 tablet, Rfl: 1  No Known Allergies    EXAM:   BP (!) 141/91 (BP Location: Right arm, Patient Position: Sitting, Cuff Size: Adult Regular)   Pulse 56   SpO2 96%   GENERAL APPEARANCE: alert, cooperative and not in distress  SKIN: no rashes, no lesions  HEAD: atraumatic, normocephalic  EYES: lids and lashes normal, conjunctivae and sclerae clear  ENT: no scars or lesions, nasal exam showed no discharge, swelling or lesions noted, tongue midline and normal, soft palate, uvula, and tonsils normal  NECK: no asymmetry, masses, or scars  LUNGS: unlabored respirations, no intercostal retractions or accessory muscle use, clear to auscultation without rales or wheezes  HEART: regular rate and rhythm without murmurs and normal S1 and S2  MUSCULOSKELETAL: no musculoskeletal defects are noted  NEURO: no focal deficits noted  PSYCH: does not appear depressed or anxious      WORKUP:  None    ASSESSMENT/PLAN:  Moreno Elena is a 51 year old male here for a follow-up visit.    1. Mild persistent asthma without complication - Well controlled, no exacerbations or oral steroid use in the past year.    - fluticasone-salmeterol (ADVAIR DISKUS) 100-50 MCG/DOSE inhaler; Inhale 1 puff into the lungs 2 times daily Needs appointment for additional refills  Dispense: 3 each; Refill: 1  - albuterol (PROAIR HFA/PROVENTIL HFA/VENTOLIN HFA) 108 (90 Base) MCG/ACT inhaler; Inhale 2 puffs into the lungs every 4 hours as needed for shortness of breath / dyspnea or wheezing (cough.)  Dispense: 18 g; Refill: 2      Follow-up in 6-12 months, sooner if needed      Thank you for allowing me to participate in the care of Moreno Elena.      Claribel Smith MD, FAAAAI  Allergy/Immunology  [facility]      Chart documentation done in part with Dragon Voice Recognition Software. Although reviewed after completion, some word and grammatical errors may  remain.

## 2022-01-24 NOTE — LETTER
"    1/24/2022         RE: Moreno Elena  [id number] [address] [id number]        Dear Colleague,    Thank you for referring your patient, Moreno Elena, to the [facility]. Please see a copy of my visit note below.    Moreno Elena was seen in the Allergy Clinic at [facility].      Moreno Elena is a 51 year old American male who is seen today for a follow-up visit. He reports that he has been doing well. He states it has been a \"boring\" health year. He has not had any asthma exacerbations or other health issues in the past year. He continues to take low dose Advair twice daily and reports no side effects from this medication. He has rarely needed to use his albuterol inhaler - primarily if he is going to be exercising in cold weather.       Past Medical History:   Diagnosis Date     Allergic rhinitis due to animal dander      exercise induced asthma 2005     House dust mite allergy      Mild persistent asthma      Obstructive sleep apnea     nasal CPAP--not doing x \"yrs\"     Seasonal allergic rhinitis '95 skin tested in [place]     allergic to \"everything\" per pt.     Family History   Problem Relation Age of Onset     Cancer Father         throat     Social History     Tobacco Use     Smoking status: Never Smoker     Smokeless tobacco: Never Used   Substance Use Topics     Alcohol use: Yes     Comment: rarely     Drug use: No     Social History     Social History Narrative    ENVIRONMENTAL HISTORY: The family lives in a 10-15 year [place]. The home is heated with a forced air. They do have central air conditioning. The patient's bedroom is furnished with carpeting in bedroom, allergen mattress cover and allergen pillowcase cover.  Pets inside the house include 3 cat(s). There is no history of cockroach or mice infestation. There is/are 0 smokers in the house.  The house does not have a damp basement. 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       Past medical, family, and social history were reviewed.    REVIEW OF SYSTEMS:  General: negative for weight gain. negative for weight loss. negative for changes in sleep.   Eyes: negative for itching. negative for redness. negative for tearing/watering. negative for vision changes  Ears: negative for fullness. negative for hearing loss. negative for dizziness.   Nose: negative for snoring.negative for changes in smell. negative for drainage.   Throat: negative for hoarseness. negative for sore throat. negative for trouble swallowing.   Lungs: negative for cough. negative for shortness of breath.negative for wheezing. negative for sputum production.   Cardiovascular: negative for chest pain. negative for swelling of ankles. negative for fast or irregular heartbeat.   Gastrointestinal: negative for nausea. negative for heartburn. negative for acid reflux.   Musculoskeletal: negative for joint pain. negative for joint stiffness. negative for joint swelling.   Neurologic: negative for seizures. negative for fainting. negative for weakness.   Psychiatric: negative for changes in mood. negative for anxiety.   Endocrine: negative for cold intolerance. negative for heat intolerance. negative for tremors.   Hematologic: negative for easy bruising. negative for easy bleeding.  Integumentary: negative for rash. negative for scaling. negative for nail changes.       Current Outpatient Medications:      albuterol (PROAIR HFA/PROVENTIL HFA/VENTOLIN HFA) 108 (90 Base) MCG/ACT inhaler, Inhale 2 puffs into the lungs every 4 hours as needed for shortness of breath / dyspnea or wheezing (cough.), Disp: 1 Inhaler, Rfl: 3     fexofenadine (ALLEGRA) 180 MG tablet, Take 1 tablet by mouth daily., Disp: 90 tablet, Rfl: 3     fluticasone-salmeterol (ADVAIR DISKUS) 100-50 MCG/DOSE inhaler, Inhale  1 puff into the lungs 2 times daily Needs appointment for additional refills, Disp: 1 each, Rfl: 1     valACYclovir (VALTREX) 1000 mg tablet, Take  by mouth. take 2 tabs q 12 hours for 2 total doses at the onset of cold sore symptoms (Patient not taking: Reported on 9/12/2017), Disp: 20 tablet, Rfl: 1  No Known Allergies    EXAM:   BP (!) 141/91 (BP Location: Right arm, Patient Position: Sitting, Cuff Size: Adult Regular)   Pulse 56   SpO2 96%   GENERAL APPEARANCE: alert, cooperative and not in distress  SKIN: no rashes, no lesions  HEAD: atraumatic, normocephalic  EYES: lids and lashes normal, conjunctivae and sclerae clear  ENT: no scars or lesions, nasal exam showed no discharge, swelling or lesions noted, tongue midline and normal, soft palate, uvula, and tonsils normal  NECK: no asymmetry, masses, or scars  LUNGS: unlabored respirations, no intercostal retractions or accessory muscle use, clear to auscultation without rales or wheezes  HEART: regular rate and rhythm without murmurs and normal S1 and S2  MUSCULOSKELETAL: no musculoskeletal defects are noted  NEURO: no focal deficits noted  PSYCH: does not appear depressed or anxious      WORKUP:  None    ASSESSMENT/PLAN:  Moreno Elena is a 51 year old male here for a follow-up visit.    1. Mild persistent asthma without complication - Well controlled, no exacerbations or oral steroid use in the past year.    - fluticasone-salmeterol (ADVAIR DISKUS) 100-50 MCG/DOSE inhaler; Inhale 1 puff into the lungs 2 times daily Needs appointment for additional refills  Dispense: 3 each; Refill: 1  - albuterol (PROAIR HFA/PROVENTIL HFA/VENTOLIN HFA) 108 (90 Base) MCG/ACT inhaler; Inhale 2 puffs into the lungs every 4 hours as needed for shortness of breath / dyspnea or wheezing (cough.)  Dispense: 18 g; Refill: 2      Follow-up in 6-12 months, sooner if needed      Thank you for allowing me to participate in the care of Moreno Elena.      Claribel Smith MD,  FAAAAI  Allergy/Immunology  [facility]      Chart documentation done in part with Dragon Voice Recognition Software. Although reviewed after completion, some word and grammatical errors may remain.      Again, thank you for allowing me to participate in the care of your patient.        Sincerely,        Claribel Smith MD

## 2022-04-26 ENCOUNTER — OFFICE VISIT (OUTPATIENT)
Dept: URGENT CARE | Facility: URGENT CARE | Age: 52
End: 2022-04-26
Payer: COMMERCIAL

## 2022-04-26 VITALS
BODY MASS INDEX: 26.2 KG/M2 | WEIGHT: 185.2 LBS | HEART RATE: 65 BPM | SYSTOLIC BLOOD PRESSURE: 135 MMHG | OXYGEN SATURATION: 97 % | TEMPERATURE: 98.5 F | DIASTOLIC BLOOD PRESSURE: 86 MMHG

## 2022-04-26 DIAGNOSIS — R05.9 COUGH: ICD-10-CM

## 2022-04-26 DIAGNOSIS — R07.0 THROAT PAIN: ICD-10-CM

## 2022-04-26 DIAGNOSIS — J06.9 VIRAL URI: Primary | ICD-10-CM

## 2022-04-26 LAB — DEPRECATED S PYO AG THROAT QL EIA: NEGATIVE

## 2022-04-26 PROCEDURE — 87651 STREP A DNA AMP PROBE: CPT | Performed by: STUDENT IN AN ORGANIZED HEALTH CARE EDUCATION/TRAINING PROGRAM

## 2022-04-26 PROCEDURE — 99203 OFFICE O/P NEW LOW 30 MIN: CPT | Performed by: STUDENT IN AN ORGANIZED HEALTH CARE EDUCATION/TRAINING PROGRAM

## 2022-04-26 RX ORDER — CODEINE PHOSPHATE/GUAIFENESIN 10-100MG/5
5 LIQUID (ML) ORAL EVERY 4 HOURS PRN
Qty: 180 ML | Refills: 0 | Status: SHIPPED | OUTPATIENT
Start: 2022-04-26 | End: 2023-03-13

## 2022-04-27 LAB — GROUP A STREP BY PCR: NOT DETECTED

## 2022-04-27 NOTE — PROGRESS NOTES
"ASSESSMENT/PLAN:  (J06.9) Viral URI  (primary encounter diagnosis)  (R07.0) Throat pain  (R05.9) Cough  Comment: Conglomeration of sx consistent with viral URI. No concerning physical exam findings and vitals WNL. Has used tessalon pearls in past which don't help. Patient states cough syrup with codeine in the past has helped significantly with his cough when he's had this in past years; we discussed risks/concerns regarding this but ultimately felt it reasonable to prescribe. Rapid strep negative.  Plan:   -guaiFENesin-codeine (GUAIFENESIN AC) 100-10 MG/5ML syrup  -Streptococcus A Rapid Screen w/Reflex to PCR - Clinic Collect, Group A Streptococcus PCR Throat Swab  -Counseled on follow-up precautions        Appropriate PPE worn.    Options for treatment and follow-up care were reviewed with the patient and/or guardian. Moreno Elena and/or guardian engaged in the decision making process and verbalized understanding of the options discussed and agreed with the final plan.    See [facility] for orders, medications, letters, patient instructions  This note was dictated with [employer].      Josemanuel Forbes MD    SUBJECTIVE:  Moreno Elena is an 51 year old male who presents for \"cough, sore throat\".    3-4 days ago began having nonproductive cough, sore throat, body aches, rhinorrhea, chills. Denies fever. Denies SOB or wheezing.  Has albuterol inhaler at home which he tried but didn't help.  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  Has tried some cold/flu syrup which helps some.  States he gets bronchitis typically every year and this feels similar.    PMH:   has a past medical history of Allergic rhinitis due to animal dander, exercise induced asthma (2005), House dust mite allergy, Mild persistent asthma, Obstructive sleep apnea, and Seasonal allergic rhinitis ('95 skin tested in [place] ).  Patient Active Problem List   Diagnosis     Exercise-induced bronchospasm     " Mild persistent asthma     Seasonal allergic rhinitis     Allergic rhinitis due to animal dander     House dust mite allergy     Obstructive sleep apnea     Pharyngitis     Advanced directives, counseling/discussion     Hyperlipidemia LDL goal <160     Nephrolithiasis     Social History     Socioeconomic History     Marital status:      Spouse name: None     Number of children: None     Years of education: None     Highest education level: None   Tobacco Use     Smoking status: Never Smoker     Smokeless tobacco: Never Used   Substance and Sexual Activity     Alcohol use: Yes     Comment: rarely     Drug use: No     Sexual activity: Yes     Partners: Female   Social History Narrative    ENVIRONMENTAL HISTORY: The family lives in a 10[place]15 year old home [place]. The home is heated with a forced air. They do have central air conditioning. The patient's bedroom is furnished with carpeting in bedroom, allergen mattress cover and allergen pillowcase cover.  Pets inside the house include 3 cat(s). There is no history of cockroach or mice infestation. There is/are 0 smokers in the house.  The house does not have a damp basement. 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     Family History   Problem Relation Age of Onset     Cancer Father         throat       ALLERGIES:  Patient has no known allergies.    Current Outpatient Medications   Medication     albuterol (PROAIR HFA/PROVENTIL HFA/VENTOLIN HFA) 108 (90 Base) MCG/ACT inhaler     fexofenadine (ALLEGRA) 180 MG tablet     fluticasone-salmeterol (ADVAIR DISKUS) 100-50 MCG/DOSE inhaler     guaiFENesin-codeine (GUAIFENESIN AC) 100-10 MG/5ML syrup     valACYclovir (VALTREX) 1000 mg tablet     No current facility-administered medications for this visit.         ROS:  ROS is done and is negative for general/constitutional, eye,  ENT, Respiratory, cardiovascular, GI, , Skin, musculoskeletal except as noted elsewhere.  All other review of systems negative except as noted elsewhere.    OBJECTIVE:  /86   Pulse 65   Temp 98.5  F (36.9  C) (Tympanic)   Wt 84 kg (185 lb 3.2 oz)   SpO2 97%   BMI 26.20 kg/m    General: Sitting comfortably. No acute distress. Nontoxic appearing.  HEENT: Conjunctivae are clear without discharge or erythema. Posterior pharynx without erythema or discharge or tonsillar enlargement. Absent uvula. No oral ulcers. Clear rhinorrhea present in b/l nares.  Neck: No masses. Scattered 1cm mobile nontender cervical adenopathy.  Respiratory: No respiratory distress. Lung sounds are clear without rales, ronchi, or wheezes.   Cardiac: RRR. Warm and well perfused. Brisk cap refill.  Extremities: Upper and lower extremities grossly normal.  Skin: Skin warm without rashes.  Neurological: Motor function is grossly normal.   Psychiatric: Good insight and judgement. Normal affect. Pleasantly interactive.      RESULTS  Results for orders placed or performed in visit on 04/26/22   Streptococcus A Rapid Screen w/Reflex to PCR - Clinic Collect     Status: Normal    Specimen: Throat; Swab   Result Value Ref Range    Group A Strep antigen Negative Negative     No results found for this or any previous visit (from the past 48 hour(s)).

## 2022-11-21 ENCOUNTER — HEALTH MAINTENANCE LETTER (OUTPATIENT)
Age: 52
End: 2022-11-21

## 2023-01-02 DIAGNOSIS — J45.30 MILD PERSISTENT ASTHMA WITHOUT COMPLICATION: Primary | ICD-10-CM

## 2023-01-02 RX ORDER — FLUTICASONE PROPIONATE AND SALMETEROL 100; 50 UG/1; UG/1
1 POWDER RESPIRATORY (INHALATION) EVERY 12 HOURS
Qty: 2 EACH | Refills: 0 | Status: SHIPPED | OUTPATIENT
Start: 2023-01-02 | End: 2023-03-13

## 2023-01-02 NOTE — TELEPHONE ENCOUNTER
Forwarding to Dr. Smith for approval of prescription. Unable to reorder from current prescription. New order paige'd up.     Patient has appointment scheduled 3/13/23.    Nely Aburto, HEMANTN, RN

## 2023-01-02 NOTE — TELEPHONE ENCOUNTER
Fax received from [facility] requesting refill of Advair 100-50 Diskus.    Yenny VERNON RN  Specialty/Allergy Clinics

## 2023-03-13 ENCOUNTER — OFFICE VISIT (OUTPATIENT)
Dept: ALLERGY | Facility: CLINIC | Age: 53
End: 2023-03-13
Payer: COMMERCIAL

## 2023-03-13 VITALS
HEART RATE: 59 BPM | DIASTOLIC BLOOD PRESSURE: 92 MMHG | WEIGHT: 181.2 LBS | OXYGEN SATURATION: 96 % | BODY MASS INDEX: 25.63 KG/M2 | SYSTOLIC BLOOD PRESSURE: 135 MMHG

## 2023-03-13 DIAGNOSIS — J45.30 MILD PERSISTENT ASTHMA WITHOUT COMPLICATION: Primary | ICD-10-CM

## 2023-03-13 LAB
FEF 25/75: NORMAL
FEV-1: NORMAL
FEV1/FVC: NORMAL
FVC: NORMAL

## 2023-03-13 PROCEDURE — 99213 OFFICE O/P EST LOW 20 MIN: CPT | Mod: 25 | Performed by: ALLERGY & IMMUNOLOGY

## 2023-03-13 PROCEDURE — 94010 BREATHING CAPACITY TEST: CPT | Performed by: ALLERGY & IMMUNOLOGY

## 2023-03-13 RX ORDER — FLUTICASONE PROPIONATE AND SALMETEROL 100; 50 UG/1; UG/1
1 POWDER RESPIRATORY (INHALATION) EVERY 12 HOURS
Qty: 3 EACH | Refills: 1 | Status: SHIPPED | OUTPATIENT
Start: 2023-03-13 | End: 2023-11-27

## 2023-03-13 ASSESSMENT — ENCOUNTER SYMPTOMS
SINUS PRESSURE: 0
MYALGIAS: 0
EYE ITCHING: 0
EYE DISCHARGE: 0
DIARRHEA: 0
WHEEZING: 0
ADENOPATHY: 0
CHEST TIGHTNESS: 0
ARTHRALGIAS: 0
RHINORRHEA: 0
ACTIVITY CHANGE: 0
FEVER: 0
FACIAL SWELLING: 0
COUGH: 0
NAUSEA: 0
FATIGUE: 0
SHORTNESS OF BREATH: 0
EYE REDNESS: 0
VOMITING: 0
JOINT SWELLING: 0
HEADACHES: 0

## 2023-03-13 ASSESSMENT — ASTHMA QUESTIONNAIRES: ACT_TOTALSCORE: 22

## 2023-03-13 NOTE — PROGRESS NOTES
"Moreno Elena was seen in the Allergy Clinic at [facility].      Moreno Elena is a 52 year old Choose not to answer male who is seen today for a follow-up visit.    Reports he has been doing well. Had COVID in 5/2022 and wasn't able to exercise for awhile due to this illness but otherwise did well. No urgent care/ED visits or prednisone use in the past year. Continues to take Advair daily and has rarely used albuterol. Has tried stopping/weaning Advair in the past but his asthma symptoms return.    Past Medical History:   Diagnosis Date     Allergic rhinitis due to animal dander      exercise induced asthma 2005     House dust mite allergy      Mild persistent asthma      Obstructive sleep apnea     nasal CPAP--not doing x \"yrs\"     Seasonal allergic rhinitis '95 skin tested in [place]     allergic to \"everything\" per pt.     Family History   Problem Relation Age of Onset     Cancer Father         throat     Social History     Tobacco Use     Smoking status: Never     Smokeless tobacco: Never   Substance Use Topics     Alcohol use: Yes     Comment: rarely     Drug use: No     Social History     Social History Narrative    ENVIRONMENTAL HISTORY: The family lives in a 10[place]15 year old home [place]. The home is heated with a forced air. They do have central air conditioning. The patient's bedroom is furnished with carpeting in bedroom, allergen mattress cover and allergen pillowcase cover.  Pets inside the house include 3 cat(s). There is no history of cockroach or mice infestation. There is/are 0 smokers in the house.  The house does not have a damp basement. 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       Past medical, family, and social history were reviewed.    Review of Systems   Constitutional: Negative for activity change, fatigue and fever.   HENT: " Negative for congestion, dental problem, ear pain, facial swelling, nosebleeds, postnasal drip, rhinorrhea, sinus pressure and sneezing.    Eyes: Negative for discharge, redness and itching.   Respiratory: Negative for cough, chest tightness, shortness of breath and wheezing.    Cardiovascular: Negative for chest pain.   Gastrointestinal: Negative for diarrhea, nausea and vomiting.   Musculoskeletal: Negative for arthralgias, joint swelling and myalgias.   Skin: Negative for rash.   Neurological: Negative for headaches.   Hematological: Negative for adenopathy.   Psychiatric/Behavioral: Negative for behavioral problems and self-injury.         Current Outpatient Medications:      albuterol (PROAIR HFA/PROVENTIL HFA/VENTOLIN HFA) 108 (90 Base) MCG/ACT inhaler, Inhale 2 puffs into the lungs every 4 hours as needed for shortness of breath / dyspnea or wheezing (cough.), Disp: 18 g, Rfl: 2     fexofenadine (ALLEGRA) 180 MG tablet, Take 1 tablet by mouth daily., Disp: 90 tablet, Rfl: 3     fluticasone-salmeterol (ADVAIR DISKUS) 100-50 MCG/ACT inhaler, Inhale 1 puff into the lungs every 12 hours, Disp: 3 each, Rfl: 1     valACYclovir (VALTREX) 1000 mg tablet, Take  by mouth. take 2 tabs q 12 hours for 2 total doses at the onset of cold sore symptoms, Disp: 20 tablet, Rfl: 1  No Known Allergies    EXAM:   BP (!) 135/92 (BP Location: Left arm, Patient Position: Sitting, Cuff Size: Adult Regular)   Pulse 59   Wt 82.2 kg (181 lb 3.2 oz)   SpO2 96%   BMI 25.63 kg/m    Physical Exam  Vitals and nursing note reviewed.   Constitutional:       Appearance: Normal appearance.   HENT:      Head: Normocephalic and atraumatic.      Right Ear: External ear normal.      Left Ear: External ear normal.      Nose: No mucosal edema or rhinorrhea.      Mouth/Throat:      Mouth: Mucous membranes are moist. No oral lesions.      Pharynx: Oropharynx is clear. Uvula midline. No posterior oropharyngeal erythema.   Eyes:      General: Lids are  normal.      Extraocular Movements: Extraocular movements intact.      Conjunctiva/sclera: Conjunctivae normal.   Neck:      Comments: No asymmetry, masses, or scars  Cardiovascular:      Rate and Rhythm: Normal rate and regular rhythm.      Heart sounds: Normal heart sounds, S1 normal and S2 normal.   Pulmonary:      Effort: Pulmonary effort is normal. No respiratory distress.      Breath sounds: Normal breath sounds and air entry.   Musculoskeletal:      Comments: No musculoskeletal defects appreciated   Skin:     General: Skin is warm and dry.      Findings: No lesion or rash.   Neurological:      General: No focal deficit present.      Mental Status: He is alert.   Psychiatric:         Mood and Affect: Mood and affect normal.           WORKUP:  Spirometry    SPIROMETRY       FVC 4.46L (89% of predicted).     FEV1 3.83L (99% of predicted).     FEV1/FVC 86%      I have reviewed and interpreted these results. Testing meets criteria for acceptability and reproducibility. Values are consistent with normal lung function.    Asthma Control Test (ACT) total score: 22       ASSESSMENT/PLAN:  Moreno Elena is a 52 year old male here for a follow-up visit.    1. Mild persistent asthma without complication - Well controlled, no exacerbations or oral steroid use in the past year. No changes to medications at this timke.    - albuterol HFA - 2 puffs every 4 hours as needed  - Spirometry, Breathing Capacity: Normal Order, Clinic Performed  - fluticasone-salmeterol (ADVAIR DISKUS) 100-50 MCG/ACT inhaler; Inhale 1 puff into the lungs every 12 hours  Dispense: 3 each; Refill: 1      Follow-up in 1 year, sooner if needed      Thank you for allowing me to participate in the care of Moreno Elena.      Claribel Smith MD, FAAAAI  Allergy/Immunology  [facility]      Chart documentation done in part with Dragon Voice Recognition Software. Although reviewed  after completion, some word and grammatical errors may remain.

## 2023-03-13 NOTE — LETTER
"    3/13/2023         RE: Moreno Elena  [address] [facility] [address]        Dear Colleague,    Thank you for referring your patient, Moreno Elena, to the [facility]. Please see a copy of my visit note below.    Moreno Elena was seen in the Allergy Clinic at [facility].      Moreno Elena is a 52 year old Choose not to answer male who is seen today for a follow-up visit.    Reports he has been doing well. Had COVID in 5/2022 and wasn't able to exercise for awhile due to this illness but otherwise did well. No urgent care/ED visits or prednisone use in the past year. Continues to take Advair daily and has rarely used albuterol. Has tried stopping/weaning Advair in the past but his asthma symptoms return.    Past Medical History:   Diagnosis Date     Allergic rhinitis due to animal dander      exercise induced asthma 2005     House dust mite allergy      Mild persistent asthma      Obstructive sleep apnea     nasal CPAP--not doing x \"yrs\"     Seasonal allergic rhinitis '95 skin tested in [place]     allergic to \"everything\" per pt.     Family History   Problem Relation Age of Onset     Cancer Father         throat     Social History     Tobacco Use     Smoking status: Never     Smokeless tobacco: Never   Substance Use Topics     Alcohol use: Yes     Comment: rarely     Drug use: No     Social History     Social History Narrative    ENVIRONMENTAL HISTORY: The family lives in a 10-15 year [place]. The home is heated with a forced air. They do have central air conditioning. The patient's bedroom is furnished with carpeting in bedroom, allergen mattress cover and allergen pillowcase cover.  Pets inside the house include 3 cat(s). There is no history of cockroach or mice infestation. There is/are 0 smokers in the house.  The house does not have a damp basement. 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       Past medical, family, and social history were reviewed.    Review of Systems   Constitutional: Negative for activity change, fatigue and fever.   HENT: Negative for congestion, dental problem, ear pain, facial swelling, nosebleeds, postnasal drip, rhinorrhea, sinus pressure and sneezing.    Eyes: Negative for discharge, redness and itching.   Respiratory: Negative for cough, chest tightness, shortness of breath and wheezing.    Cardiovascular: Negative for chest pain.   Gastrointestinal: Negative for diarrhea, nausea and vomiting.   Musculoskeletal: Negative for arthralgias, joint swelling and myalgias.   Skin: Negative for rash.   Neurological: Negative for headaches.   Hematological: Negative for adenopathy.   Psychiatric/Behavioral: Negative for behavioral problems and self-injury.         Current Outpatient Medications:      albuterol (PROAIR HFA/PROVENTIL HFA/VENTOLIN HFA) 108 (90 Base) MCG/ACT inhaler, Inhale 2 puffs into the lungs every 4 hours as needed for shortness of breath / dyspnea or wheezing (cough.), Disp: 18 g, Rfl: 2     fexofenadine (ALLEGRA) 180 MG tablet, Take 1 tablet by mouth daily., Disp: 90 tablet, Rfl: 3     fluticasone-salmeterol (ADVAIR DISKUS) 100-50 MCG/ACT inhaler, Inhale 1 puff into the lungs every 12 hours, Disp: 3 each, Rfl: 1     valACYclovir (VALTREX) 1000 mg tablet, Take  by mouth. take 2 tabs q 12 hours for 2 total doses at the onset of cold sore symptoms, Disp: 20 tablet, Rfl: 1  No Known Allergies    EXAM:   BP (!) 135/92 (BP Location: Left arm, Patient Position: Sitting, Cuff Size: Adult Regular)   Pulse 59   Wt 82.2 kg (181 lb 3.2 oz)   SpO2 96%   BMI 25.63 kg/m    Physical Exam  Vitals and nursing note reviewed.   Constitutional:       Appearance: Normal appearance.   HENT:      Head: Normocephalic and atraumatic.      Right Ear: External ear normal.      Left Ear:  External ear normal.      Nose: No mucosal edema or rhinorrhea.      Mouth/Throat:      Mouth: Mucous membranes are moist. No oral lesions.      Pharynx: Oropharynx is clear. Uvula midline. No posterior oropharyngeal erythema.   Eyes:      General: Lids are normal.      Extraocular Movements: Extraocular movements intact.      Conjunctiva/sclera: Conjunctivae normal.   Neck:      Comments: No asymmetry, masses, or scars  Cardiovascular:      Rate and Rhythm: Normal rate and regular rhythm.      Heart sounds: Normal heart sounds, S1 normal and S2 normal.   Pulmonary:      Effort: Pulmonary effort is normal. No respiratory distress.      Breath sounds: Normal breath sounds and air entry.   Musculoskeletal:      Comments: No musculoskeletal defects appreciated   Skin:     General: Skin is warm and dry.      Findings: No lesion or rash.   Neurological:      General: No focal deficit present.      Mental Status: He is alert.   Psychiatric:         Mood and Affect: Mood and affect normal.           WORKUP:  Spirometry    SPIROMETRY       FVC 4.46L (89% of predicted).     FEV1 3.83L (99% of predicted).     FEV1/FVC 86%      I have reviewed and interpreted these results. Testing meets criteria for acceptability and reproducibility. Values are consistent with normal lung function.    Asthma Control Test (ACT) total score: 22       ASSESSMENT/PLAN:  Moreno Elena is a 52 year old male here for a follow-up visit.    1. Mild persistent asthma without complication - Well controlled, no exacerbations or oral steroid use in the past year. No changes to medications at this timke.    - albuterol HFA - 2 puffs every 4 hours as needed  - Spirometry, Breathing Capacity: Normal Order, Clinic Performed  - fluticasone-salmeterol (ADVAIR DISKUS) 100-50 MCG/ACT inhaler; Inhale 1 puff into the lungs every 12 hours  Dispense: 3 each; Refill: 1      Follow-up in 1 year, sooner if needed      Thank you for allowing me to participate in the care  of Moreno Elena.      Claribel Smith MD, FAAAAI  Allergy/Immunology  [facility]      Chart documentation done in part with Dragon Voice Recognition Software. Although reviewed after completion, some word and grammatical errors may remain.      Again, thank you for allowing me to participate in the care of your patient.        Sincerely,        Claribel Smith MD

## 2023-04-16 ENCOUNTER — HEALTH MAINTENANCE LETTER (OUTPATIENT)
Age: 53
End: 2023-04-16

## 2023-11-27 DIAGNOSIS — J45.30 MILD PERSISTENT ASTHMA WITHOUT COMPLICATION: ICD-10-CM

## 2023-11-27 NOTE — TELEPHONE ENCOUNTER
Pending Prescriptions:                       Disp   Refills    fluticasone-salmeterol (ADVAIR DISKUS) 10*3 each 0            Sig: Inhale 1 puff into the lungs every 12 hours    Routing refill request to provider for review/approval because:  Last office visit greater than 6 months ago.  Last ACT=22 on 3/13/23  LOV: 3/13/23 follow-up recommened in 1 year  No upcoming appointment scheduled.    Nely Aburto, BSN, RN

## 2023-11-29 RX ORDER — FLUTICASONE PROPIONATE AND SALMETEROL 100; 50 UG/1; UG/1
1 POWDER RESPIRATORY (INHALATION) EVERY 12 HOURS
Qty: 3 EACH | Refills: 0 | Status: SHIPPED | OUTPATIENT
Start: 2023-11-29 | End: 2024-05-23

## 2024-03-29 ENCOUNTER — TRANSFERRED RECORDS (OUTPATIENT)
Dept: HEALTH INFORMATION MANAGEMENT | Facility: CLINIC | Age: 54
End: 2024-03-29
Payer: COMMERCIAL

## 2024-05-23 DIAGNOSIS — J45.30 MILD PERSISTENT ASTHMA WITHOUT COMPLICATION: ICD-10-CM

## 2024-05-23 RX ORDER — FLUTICASONE PROPIONATE AND SALMETEROL 100; 50 UG/1; UG/1
1 POWDER RESPIRATORY (INHALATION) EVERY 12 HOURS
Qty: 3 EACH | Refills: 0 | Status: SHIPPED | OUTPATIENT
Start: 2024-05-23 | End: 2024-07-16

## 2024-05-23 NOTE — TELEPHONE ENCOUNTER
Routing refill request to provider for review/approval because:  Last office visit greater than 6 months ago.  Last ACT=22 on 3/13/23  LOV: 3/13/23 follow-up recommened in 1 year  No upcoming appointment scheduled.    HEMANT QuinonesN, RN, PHN      Requested Prescriptions   Pending Prescriptions Disp Refills    fluticasone-salmeterol (ADVAIR DISKUS) 100-50 MCG/ACT inhaler 3 each 0     Sig: Inhale 1 puff into the lungs every 12 hours       Inhaled Steroids Protocol Failed - 5/23/2024  2:04 PM        Failed - Asthma control assessment score within normal limits in last 6 months     Please review ACT score.           Failed - Recent (6 mo) or future (90 days) visit within the authorizing provider's specialty     The patient must have completed an in-person or virtual visit within the past 6 months or has a future visit scheduled within the next 90 days with the authorizing provider s specialty.  Urgent care and e-visits do not quality as an office visit for this protocol.          Passed - Patient is age 12 or older        Passed - Medication is active on med list        Passed - Medication indicated for associated diagnosis     Medication is associated with one or more of the following diagnoses:    Allergies   Asthma   COPD   Nasal Congestion   Nasal Polyps   Rhinitis

## 2024-06-22 ENCOUNTER — HEALTH MAINTENANCE LETTER (OUTPATIENT)
Age: 54
End: 2024-06-22

## 2024-07-15 ASSESSMENT — ASTHMA QUESTIONNAIRES
QUESTION_3 LAST FOUR WEEKS HOW OFTEN DID YOUR ASTHMA SYMPTOMS (WHEEZING, COUGHING, SHORTNESS OF BREATH, CHEST TIGHTNESS OR PAIN) WAKE YOU UP AT NIGHT OR EARLIER THAN USUAL IN THE MORNING: NOT AT ALL
ACT_TOTALSCORE: 25
QUESTION_2 LAST FOUR WEEKS HOW OFTEN HAVE YOU HAD SHORTNESS OF BREATH: NOT AT ALL
QUESTION_5 LAST FOUR WEEKS HOW WOULD YOU RATE YOUR ASTHMA CONTROL: COMPLETELY CONTROLLED
QUESTION_1 LAST FOUR WEEKS HOW MUCH OF THE TIME DID YOUR ASTHMA KEEP YOU FROM GETTING AS MUCH DONE AT WORK, SCHOOL OR AT HOME: NONE OF THE TIME
QUESTION_4 LAST FOUR WEEKS HOW OFTEN HAVE YOU USED YOUR RESCUE INHALER OR NEBULIZER MEDICATION (SUCH AS ALBUTEROL): NOT AT ALL

## 2024-07-16 ENCOUNTER — OFFICE VISIT (OUTPATIENT)
Dept: ALLERGY | Facility: CLINIC | Age: 54
End: 2024-07-16
Payer: COMMERCIAL

## 2024-07-16 VITALS
WEIGHT: 173.2 LBS | BODY MASS INDEX: 24.5 KG/M2 | SYSTOLIC BLOOD PRESSURE: 103 MMHG | OXYGEN SATURATION: 96 % | DIASTOLIC BLOOD PRESSURE: 70 MMHG | HEART RATE: 59 BPM

## 2024-07-16 DIAGNOSIS — J45.30 MILD PERSISTENT ASTHMA WITHOUT COMPLICATION: Primary | ICD-10-CM

## 2024-07-16 LAB
FEF 25/75: NORMAL
FEV-1: NORMAL
FEV1/FVC: NORMAL
FVC: NORMAL

## 2024-07-16 PROCEDURE — 99213 OFFICE O/P EST LOW 20 MIN: CPT | Mod: 25 | Performed by: ALLERGY & IMMUNOLOGY

## 2024-07-16 PROCEDURE — 94010 BREATHING CAPACITY TEST: CPT | Performed by: ALLERGY & IMMUNOLOGY

## 2024-07-16 RX ORDER — FLUTICASONE PROPIONATE AND SALMETEROL 100; 50 UG/1; UG/1
1 POWDER RESPIRATORY (INHALATION) EVERY 12 HOURS
Qty: 3 EACH | Refills: 3 | Status: SHIPPED | OUTPATIENT
Start: 2024-07-16

## 2024-07-16 RX ORDER — ALBUTEROL SULFATE 90 UG/1
2 AEROSOL, METERED RESPIRATORY (INHALATION) EVERY 4 HOURS PRN
Qty: 18 G | Refills: 2 | Status: SHIPPED | OUTPATIENT
Start: 2024-07-16

## 2024-07-16 ASSESSMENT — ASTHMA QUESTIONNAIRES: ACT_TOTALSCORE: 25

## 2024-07-16 NOTE — LETTER
"7/16/2024      Moreno Elena  [address] [address]      Dear Colleague,    Thank you for referring your patient, Moreno Elena, to the [facility]. Please see a copy of my visit note below.    Moreno Elena was seen in the Allergy Clinic at [facility].      Moreno Elena is a 53 year old Choose not to answer male who is seen today for a follow-up visit.    Reports he has been doing well. Used albuterol sporadically in the past year. No exacerbations or oral steroid use in the past year. Takes Advair once daily as he often forgets to take the second dose. However, if he misses his daily dose of Advair he will start to have increased asthma symptoms.      Past Medical History:   Diagnosis Date     Allergic rhinitis due to animal dander      exercise induced asthma 2005     House dust mite allergy      Mild persistent asthma      Obstructive sleep apnea     nasal CPAP--not doing x \"yrs\"     Seasonal allergic rhinitis '95 skin tested in [place]     allergic to \"everything\" per pt.     Family History   Problem Relation Age of Onset     Cancer Father         throat     Social History     Tobacco Use     Smoking status: Never     Smokeless tobacco: Never   Vaping Use     Vaping status: Never Used   Substance Use Topics     Alcohol use: Yes     Comment: rarely     Drug use: No     Social History     Social History Narrative    ENVIRONMENTAL HISTORY: The family lives in a 10[place]15 [place]. The home is heated with a forced air. They do have central air conditioning. The patient's bedroom is furnished with carpeting in bedroom, allergen mattress cover and allergen pillowcase cover.  Pets inside the house include 3 cat(s). There is no history of cockroach or mice infestation. There is/are 0 smokers in the house.  The house does not have a damp basement. 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       Past medical, family, and social history were reviewed.        Current Outpatient Medications:      albuterol (PROAIR HFA/PROVENTIL HFA/VENTOLIN HFA) 108 (90 Base) MCG/ACT inhaler, Inhale 2 puffs into the lungs every 4 hours as needed for shortness of breath / dyspnea or wheezing (cough.), Disp: 18 g, Rfl: 2     fexofenadine (ALLEGRA) 180 MG tablet, Take 1 tablet by mouth daily., Disp: 90 tablet, Rfl: 3     fluticasone-salmeterol (ADVAIR DISKUS) 100-50 MCG/ACT inhaler, Inhale 1 puff into the lungs every 12 hours Needs appointment for additional refills, Disp: 3 each, Rfl: 0     valACYclovir (VALTREX) 1000 mg tablet, Take  by mouth. take 2 tabs q 12 hours for 2 total doses at the onset of cold sore symptoms, Disp: 20 tablet, Rfl: 1  No Known Allergies    EXAM:   /70   Pulse 59   Wt 78.6 kg (173 lb 3.2 oz)   SpO2 96%   BMI 24.50 kg/m    Physical Exam  Vitals and nursing note reviewed.   Constitutional:       Appearance: Normal appearance.   HENT:      Head: Normocephalic and atraumatic.      Right Ear: External ear normal.      Left Ear: External ear normal.      Nose: No mucosal edema, congestion or rhinorrhea.      Mouth/Throat:      Mouth: Mucous membranes are moist. No oral lesions.      Pharynx: Oropharynx is clear. Uvula midline. No posterior oropharyngeal erythema.   Eyes:      General: Lids are normal.      Extraocular Movements: Extraocular movements intact.      Conjunctiva/sclera: Conjunctivae normal.   Neck:      Comments: No asymmetry, masses, or scars  Cardiovascular:      Rate and Rhythm: Normal rate and regular rhythm.      Heart sounds: S1 normal and S2 normal. No murmur heard.  Pulmonary:      Effort: Pulmonary effort is normal. No respiratory distress.      Breath sounds: Normal breath sounds and air entry.   Musculoskeletal:      Comments: No musculoskeletal defects noted   Skin:     General:  Skin is warm and dry.      Findings: No lesion or rash.   Neurological:      General: No focal deficit present.      Mental Status: He is alert.   Psychiatric:         Mood and Affect: Mood and affect normal.           WORKUP:  Spirometry    SPIROMETRY       FVC 4.96L (99% of predicted).     FEV1 4.20L (109% of predicted).     FEV1/FVC 85%      I have reviewed and interpreted these results. Testing meets criteria for acceptability and reproducibility. Values are consistent with normal lung function.    Asthma Control Test (ACT) total score: 25     ASSESSMENT/PLAN:  Moreno Elena is a 53 year old male here for a follow-up visit.    1. Mild persistent asthma without complication - Controlled with daily Advair. Spirometry today is normal. No exacerbations or oral steroid use in the past year. Plan to continue with Advair as prescribed as he has experienced return of his asthma symptoms after missing a few days of medication.    - BREATHING CAPACITY TEST [12313]  - fluticasone-salmeterol (ADVAIR DISKUS) 100-50 MCG/ACT inhaler; Inhale 1 puff into the lungs every 12 hours  Dispense: 3 each; Refill: 3  - albuterol (PROAIR HFA/PROVENTIL HFA/VENTOLIN HFA) 108 (90 Base) MCG/ACT inhaler; Inhale 2 puffs into the lungs every 4 hours as needed for shortness of breath or wheezing (cough.)  Dispense: 18 g; Refill: 2  - Adult Allergy Clinic Follow-Up Order; Future      Follow-up in 1 year, sooner if needed      Thank you for allowing me to participate in the care of Moreno Elena.      Claribel Smith MD, FAAAAI  Allergy/Immunology  [facility]      Consent was obtained from the patient to use an AI documentation tool in the creation of this note.    Chart documentation done in part with Dragon Voice Recognition Software. Although reviewed after completion, some word and grammatical errors may remain.      Again, thank you for allowing me to participate in  the care of your patient.        Sincerely,        Claribel Smith MD

## 2024-07-16 NOTE — PROGRESS NOTES
"Moreno Elena was seen in the Allergy Clinic at [facility].      Moreno Elena is a 53 year old Choose not to answer male who is seen today for a follow-up visit.    Reports he has been doing well. Used albuterol sporadically in the past year. No exacerbations or oral steroid use in the past year. Takes Advair once daily as he often forgets to take the second dose. However, if he misses his daily dose of Advair he will start to have increased asthma symptoms.      Past Medical History:   Diagnosis Date    Allergic rhinitis due to animal dander     exercise induced asthma 2005    House dust mite allergy     Mild persistent asthma     Obstructive sleep apnea     nasal CPAP--not doing x \"yrs\"    Seasonal allergic rhinitis '95 skin tested in [place]     allergic to \"everything\" per pt.     Family History   Problem Relation Age of Onset    Cancer Father         throat     Social History     Tobacco Use    Smoking status: Never    Smokeless tobacco: Never   Vaping Use    Vaping status: Never Used   Substance Use Topics    Alcohol use: Yes     Comment: rarely    Drug use: No     Social History     Social History Narrative    ENVIRONMENTAL HISTORY: The family lives in a 10[place]15 year old home [place]. The home is heated with a forced air. They do have central air conditioning. The patient's bedroom is furnished with carpeting in bedroom, allergen mattress cover and allergen pillowcase cover.  Pets inside the house include 3 cat(s). There is no history of cockroach or mice infestation. There is/are 0 smokers in the house.  The house does not have a damp basement. 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       Past medical, family, and social history were reviewed.        Current Outpatient Medications:     albuterol (PROAIR HFA/PROVENTIL HFA/VENTOLIN HFA) 108 (90 " Base) MCG/ACT inhaler, Inhale 2 puffs into the lungs every 4 hours as needed for shortness of breath / dyspnea or wheezing (cough.), Disp: 18 g, Rfl: 2    fexofenadine (ALLEGRA) 180 MG tablet, Take 1 tablet by mouth daily., Disp: 90 tablet, Rfl: 3    fluticasone-salmeterol (ADVAIR DISKUS) 100-50 MCG/ACT inhaler, Inhale 1 puff into the lungs every 12 hours Needs appointment for additional refills, Disp: 3 each, Rfl: 0    valACYclovir (VALTREX) 1000 mg tablet, Take  by mouth. take 2 tabs q 12 hours for 2 total doses at the onset of cold sore symptoms, Disp: 20 tablet, Rfl: 1  No Known Allergies    EXAM:   /70   Pulse 59   Wt 78.6 kg (173 lb 3.2 oz)   SpO2 96%   BMI 24.50 kg/m    Physical Exam  Vitals and nursing note reviewed.   Constitutional:       Appearance: Normal appearance.   HENT:      Head: Normocephalic and atraumatic.      Right Ear: External ear normal.      Left Ear: External ear normal.      Nose: No mucosal edema, congestion or rhinorrhea.      Mouth/Throat:      Mouth: Mucous membranes are moist. No oral lesions.      Pharynx: Oropharynx is clear. Uvula midline. No posterior oropharyngeal erythema.   Eyes:      General: Lids are normal.      Extraocular Movements: Extraocular movements intact.      Conjunctiva/sclera: Conjunctivae normal.   Neck:      Comments: No asymmetry, masses, or scars  Cardiovascular:      Rate and Rhythm: Normal rate and regular rhythm.      Heart sounds: S1 normal and S2 normal. No murmur heard.  Pulmonary:      Effort: Pulmonary effort is normal. No respiratory distress.      Breath sounds: Normal breath sounds and air entry.   Musculoskeletal:      Comments: No musculoskeletal defects noted   Skin:     General: Skin is warm and dry.      Findings: No lesion or rash.   Neurological:      General: No focal deficit present.      Mental Status: He is alert.   Psychiatric:         Mood and Affect: Mood and affect normal.           WORKUP:  Spirometry    SPIROMETRY        FVC 4.96L (99% of predicted).     FEV1 4.20L (109% of predicted).     FEV1/FVC 85%      I have reviewed and interpreted these results. Testing meets criteria for acceptability and reproducibility. Values are consistent with normal lung function.    Asthma Control Test (ACT) total score: 25     ASSESSMENT/PLAN:  Moreno Elena is a 53 year old male here for a follow-up visit.    1. Mild persistent asthma without complication - Controlled with daily Advair. Spirometry today is normal. No exacerbations or oral steroid use in the past year. Plan to continue with Advair as prescribed as he has experienced return of his asthma symptoms after missing a few days of medication.    - BREATHING CAPACITY TEST [14921]  - fluticasone-salmeterol (ADVAIR DISKUS) 100-50 MCG/ACT inhaler; Inhale 1 puff into the lungs every 12 hours  Dispense: 3 each; Refill: 3  - albuterol (PROAIR HFA/PROVENTIL HFA/VENTOLIN HFA) 108 (90 Base) MCG/ACT inhaler; Inhale 2 puffs into the lungs every 4 hours as needed for shortness of breath or wheezing (cough.)  Dispense: 18 g; Refill: 2  - Adult Allergy Clinic Follow-Up Order; Future      Follow-up in 1 year, sooner if needed      Thank you for allowing me to participate in the care of Moreno Elena.      Claribel Smith MD, FAAAAI  Allergy/Immunology  [facility]      Consent was obtained from the patient to use an AI documentation tool in the creation of this note.    Chart documentation done in part with Dragon Voice Recognition Software. Although reviewed after completion, some word and grammatical errors may remain.

## 2024-10-09 ENCOUNTER — OFFICE VISIT (OUTPATIENT)
Dept: URGENT CARE | Facility: URGENT CARE | Age: 54
End: 2024-10-09
Payer: COMMERCIAL

## 2024-10-09 VITALS
WEIGHT: 174.4 LBS | BODY MASS INDEX: 24.67 KG/M2 | DIASTOLIC BLOOD PRESSURE: 87 MMHG | RESPIRATION RATE: 16 BRPM | TEMPERATURE: 98.6 F | OXYGEN SATURATION: 97 % | SYSTOLIC BLOOD PRESSURE: 129 MMHG | HEART RATE: 76 BPM

## 2024-10-09 DIAGNOSIS — H65.92 OME (OTITIS MEDIA WITH EFFUSION), LEFT: Primary | ICD-10-CM

## 2024-10-09 PROCEDURE — 99213 OFFICE O/P EST LOW 20 MIN: CPT | Performed by: PHYSICIAN ASSISTANT

## 2024-10-09 NOTE — PROGRESS NOTES
"Patient presents with:  Cold Symptoms  Plugged Ears: Left ear.   Nasal Congestion  Generalized Body Aches: Some body ache.       Clinical Decision Making:  Patient experiencing worsening nasal congestion.  Left ear is plugged.  Bulging and effusion noted on exam.  Patient started on Augmentin for treatment of otitis media.  Recommend Afrin for 3 days to minimize pressure behind the tympanic membrane.  Lungs are CTA.      ICD-10-CM    1. OME (otitis media with effusion), left  H65.92 amoxicillin-clavulanate (AUGMENTIN) 875-125 MG tablet          Patient Instructions   1) Take antibiotic as prescribed. Take this medication with food to avoid stomach upset.   2) Ibuprofen or Tylenol as needed for pain.  3) I recommend using Afrin for 3 days. After 3 days stop using the Afrin and switch to just Flonase and saline nasal rinses.   4) Follow up in 7-10 days if not improving, sooner if worsening or other concerns.      HPI:  Moreno Elena is a 53 year old male who presents today with concerns of bodyaches and nasal congestion and plugged sensation on the left ear that started 5 days ago.  No known fevers.  Not much of a cough.  Patient does have history of seasonal allergies and he takes Allegra and does Ava Zambrano.  He has also been taking Tylenol and ibuprofen for the body aches.  He works as a     History obtained from the patient.    Problem List:  2012-10: Nephrolithiasis  2011-05: Advanced directives, counseling/discussion  2011-05: Hyperlipidemia LDL goal <160  2011-04: Pharyngitis  Exercise-induced bronchospasm  Mild persistent asthma  Seasonal allergic rhinitis  Allergic rhinitis due to animal dander  House dust mite allergy  Obstructive sleep apnea      Past Medical History:   Diagnosis Date    Allergic rhinitis due to animal dander     exercise induced asthma 2005    House dust mite allergy     Mild persistent asthma     Obstructive sleep apnea     nasal CPAP--not doing x \"yrs\"    Seasonal " "allergic rhinitis '95 skin tested in [place]     allergic to \"everything\" per pt.       Social History     Tobacco Use    Smoking status: Never    Smokeless tobacco: Never   Substance Use Topics    Alcohol use: Yes     Comment: rarely         Review of Systems    Vitals:    10/09/24 1733   BP: 129/87   Pulse: 76   Resp: 16   Temp: 98.6  F (37  C)   TempSrc: Tympanic   SpO2: 97%   Weight: 79.1 kg (174 lb 6.4 oz)       Physical Exam  Vitals and nursing note reviewed.   Constitutional:       General: He is not in acute distress.     Appearance: He is not toxic-appearing or diaphoretic.   HENT:      Head: Normocephalic and atraumatic.      Right Ear: Tympanic membrane, ear canal and external ear normal.      Left Ear: Ear canal and external ear normal. A middle ear effusion is present. Tympanic membrane is erythematous and bulging.      Nose: Congestion present.      Mouth/Throat:      Mouth: Mucous membranes are moist.      Pharynx: No oropharyngeal exudate or posterior oropharyngeal erythema.   Eyes:      Conjunctiva/sclera: Conjunctivae normal.   Cardiovascular:      Rate and Rhythm: Normal rate and regular rhythm.      Heart sounds: No murmur heard.  Pulmonary:      Effort: Pulmonary effort is normal. No respiratory distress.      Breath sounds: Normal breath sounds. No stridor. No wheezing, rhonchi or rales.   Neurological:      Mental Status: He is alert.   Psychiatric:         Mood and Affect: Mood normal.         Behavior: Behavior normal.         Thought Content: Thought content normal.         Judgment: Judgment normal.         At the end of the encounter, I discussed results, diagnosis, medications. Discussed red flags for immediate return to clinic/ER, as well as indications for follow up if no improvement. Patient understood and agreed to plan. Patient was stable for discharge.  "

## 2024-10-09 NOTE — PATIENT INSTRUCTIONS
1) Take antibiotic as prescribed. Take this medication with food to avoid stomach upset.   2) Ibuprofen or Tylenol as needed for pain.  3) I recommend using Afrin for 3 days. After 3 days stop using the Afrin and switch to just Flonase and saline nasal rinses.   4) Follow up in 7-10 days if not improving, sooner if worsening or other concerns.

## 2025-07-12 ENCOUNTER — HEALTH MAINTENANCE LETTER (OUTPATIENT)
Age: 55
End: 2025-07-12

## 2025-07-17 ENCOUNTER — OFFICE VISIT (OUTPATIENT)
Dept: ALLERGY | Facility: CLINIC | Age: 55
End: 2025-07-17
Attending: ALLERGY & IMMUNOLOGY
Payer: COMMERCIAL

## 2025-07-17 VITALS
WEIGHT: 171.8 LBS | HEART RATE: 73 BPM | BODY MASS INDEX: 23.27 KG/M2 | SYSTOLIC BLOOD PRESSURE: 118 MMHG | OXYGEN SATURATION: 96 % | DIASTOLIC BLOOD PRESSURE: 84 MMHG | HEIGHT: 72 IN

## 2025-07-17 DIAGNOSIS — J45.30 MILD PERSISTENT ASTHMA WITHOUT COMPLICATION: ICD-10-CM

## 2025-07-17 LAB
EXPTIME-PRE: 10.31 SEC
FEF2575-%PRED-PRE: 149 %
FEF2575-PRE: 4.76 L/SEC
FEF2575-PRED: 3.17 L/SEC
FEFMAX-%PRED-PRE: 99 %
FEFMAX-PRE: 9.88 L/SEC
FEFMAX-PRED: 9.91 L/SEC
FEV1-%PRED-PRE: 113 %
FEV1-PRE: 4.26 L
FEV1FEV6-PRE: 86 %
FEV1FEV6-PRED: 80 %
FEV1FVC-PRE: 82 %
FEV1FVC-PRED: 78 %
FEV1SVC-PRED: 68 L
FIFMAX-PRE: 4.52 L/SEC
FVC-%PRED-PRE: 107 %
FVC-PRE: 5.17 L
FVC-PRED: 4.81 L
Lab: 105 %

## 2025-07-17 RX ORDER — FLUTICASONE PROPIONATE AND SALMETEROL 100; 50 UG/1; UG/1
1 POWDER RESPIRATORY (INHALATION) EVERY 12 HOURS
Qty: 3 EACH | Refills: 3 | Status: SHIPPED | OUTPATIENT
Start: 2025-07-17

## 2025-07-17 ASSESSMENT — ASTHMA QUESTIONNAIRES: ACT_TOTALSCORE: 24

## 2025-07-17 NOTE — LETTER
"7/17/2025      Moreno Elena  [id number] [facility] [id number]      Dear Colleague,    Thank you for referring your patient, Moreno Elena, to the [facility]. Please see a copy of my visit note below.    Moreno Elena was seen in the Allergy Clinic at [facility].      Moreno Elena is a 54 year old Choose not to answer male who is seen today for a follow-up visit.    Reports he has been doing well. No exacerbations or oral steroid use in the last 6 months. Continues to take Advair regularly and has not had any side effects. Does occasionally miss a day or two of medication and will notice his symptoms returning which prompts him to resume the medication.    Past Medical History:   Diagnosis Date     Allergic rhinitis due to animal dander      exercise induced asthma 2005     House dust mite allergy      Mild persistent asthma      Obstructive sleep apnea     nasal CPAP--not doing x \"yrs\"     Seasonal allergic rhinitis '95 skin tested in [place]     allergic to \"everything\" per pt.     Family History   Problem Relation Age of Onset     Cancer Father         throat     Social History     Tobacco Use     Smoking status: Never     Smokeless tobacco: Never   Vaping Use     Vaping status: Never Used   Substance Use Topics     Alcohol use: Yes     Comment: rarely     Drug use: No     Social History     Social History Narrative    ENVIRONMENTAL HISTORY: The family [place] in a 10-15 year [place] The home is heated with a forced air. They do have central air conditioning. The patient's bedroom is furnished with carpeting in bedroom, allergen mattress cover and allergen pillowcase cover.  Pets inside the house include 3 cat(s). There is no history of cockroach or mice infestation. There is/are 0 smokers in the house.  The house does not have a damp basement. 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       Past medical, family, and social history were reviewed.        Current Outpatient Medications:      albuterol (PROAIR HFA/PROVENTIL HFA/VENTOLIN HFA) 108 (90 Base) MCG/ACT inhaler, Inhale 2 puffs into the lungs every 4 hours as needed for shortness of breath or wheezing (cough.), Disp: 18 g, Rfl: 2     fexofenadine (ALLEGRA) 180 MG tablet, Take 1 tablet by mouth daily., Disp: 90 tablet, Rfl: 3     fluticasone-salmeterol (ADVAIR DISKUS) 100-50 MCG/ACT inhaler, Inhale 1 puff into the lungs every 12 hours., Disp: 3 each, Rfl: 3     valACYclovir (VALTREX) 1000 mg tablet, Take  by mouth. take 2 tabs q 12 hours for 2 total doses at the onset of cold sore symptoms, Disp: 20 tablet, Rfl: 1  No Known Allergies    EXAM:   /84 (BP Location: Right arm, Patient Position: Sitting, Cuff Size: Adult Regular)   Pulse 73   Ht 1.829 m (6')   Wt 77.9 kg (171 lb 12.8 oz)   SpO2 96%   BMI 23.30 kg/m    Physical Exam  Vitals and nursing note reviewed.   Constitutional:       Appearance: Normal appearance.   HENT:      Head: Normocephalic and atraumatic.      Right Ear: External ear normal.      Left Ear: External ear normal.      Nose: No mucosal edema, congestion or rhinorrhea.      Mouth/Throat:      Mouth: Mucous membranes are moist. No oral lesions.      Pharynx: Oropharynx is clear. Uvula midline. No posterior oropharyngeal erythema.   Eyes:      General: Lids are normal.      Extraocular Movements: Extraocular movements intact.      Conjunctiva/sclera: Conjunctivae normal.   Neck:      Comments: No asymmetry, masses, or scars  Cardiovascular:      Rate and Rhythm: Normal rate and regular rhythm.      Heart sounds: S1 normal and S2 normal. No murmur heard.  Pulmonary:      Effort: Pulmonary effort is normal. No respiratory distress.      Breath sounds: Normal breath sounds and air entry.   Musculoskeletal:      Comments: No  musculoskeletal defects noted   Skin:     General: Skin is warm and dry.      Findings: No lesion or rash.   Neurological:      General: No focal deficit present.      Mental Status: He is alert.   Psychiatric:         Mood and Affect: Mood and affect normal.           WORKUP:  Spirometry reviewed and is normal      ASSESSMENT/PLAN:  Moreno Elena is a 54 year old male here for a follow-up visit.    1. Mild persistent asthma without complication - Controlled. No exacerbations or oral steroid use in the past year. Does report return of asthma symptoms if he misses a day or two of his maintenance medication so will not plan to wean medication at this time.    - Adult Allergy Clinic Follow-Up Order  - Spirometry, Breathing Capacity: Normal Order, Clinic Performed  - Pulmonary Function Test  - fluticasone-salmeterol (ADVAIR DISKUS) 100-50 MCG/ACT inhaler; Inhale 1 puff into the lungs every 12 hours.  Dispense: 3 each; Refill: 3      Follow-up in 1 year, sooner if needed      Thank you for allowing me to participate in the care of Moreno Elena.      Claribel Smith MD, FAAAAI  Allergy/Immunology  [facility]      Consent was obtained from the patient to use an AI documentation tool in the creation of this note.    Chart documentation done in part with Dragon Voice Recognition Software. Although reviewed after completion, some word and grammatical errors may remain.    Again, thank you for allowing me to participate in the care of your patient.        Sincerely,        Claribel Smith MD    Electronically signed

## 2025-07-17 NOTE — PROGRESS NOTES
"Moreno Elena was seen in the Allergy Clinic at [facility].      Moreno Elena is a 54 year old Choose not to answer male who is seen today for a follow-up visit.    Reports he has been doing well. No exacerbations or oral steroid use in the last 6 months. Continues to take Advair regularly and has not had any side effects. Does occasionally miss a day or two of medication and will notice his symptoms returning which prompts him to resume the medication.    Past Medical History:   Diagnosis Date    Allergic rhinitis due to animal dander     exercise induced asthma 2005    House dust mite allergy     Mild persistent asthma     Obstructive sleep apnea     nasal CPAP--not doing x \"yrs\"    Seasonal allergic rhinitis '95 skin tested in [place]     allergic to \"everything\" per pt.     Family History   Problem Relation Age of Onset    Cancer Father         throat     Social History     Tobacco Use    Smoking status: Never    Smokeless tobacco: Never   Vaping Use    Vaping status: Never Used   Substance Use Topics    Alcohol use: Yes     Comment: rarely    Drug use: No     Social History     Social History Narrative    ENVIRONMENTAL HISTORY: The family lives in a 10[place]15 year old [place]. The home is heated with a forced air. They do have central air conditioning. The patient's bedroom is furnished with carpeting in bedroom, allergen mattress cover and allergen pillowcase cover.  Pets inside the house include 3 cat(s). There is no history of cockroach or mice infestation. There is/are 0 smokers in the house.  The house does not have a damp basement. 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       Past medical, family, and social history were reviewed.        Current Outpatient Medications:     albuterol (PROAIR HFA/PROVENTIL HFA/VENTOLIN HFA) 108 (90 Base) " MCG/ACT inhaler, Inhale 2 puffs into the lungs every 4 hours as needed for shortness of breath or wheezing (cough.), Disp: 18 g, Rfl: 2    fexofenadine (ALLEGRA) 180 MG tablet, Take 1 tablet by mouth daily., Disp: 90 tablet, Rfl: 3    fluticasone-salmeterol (ADVAIR DISKUS) 100-50 MCG/ACT inhaler, Inhale 1 puff into the lungs every 12 hours., Disp: 3 each, Rfl: 3    valACYclovir (VALTREX) 1000 mg tablet, Take  by mouth. take 2 tabs q 12 hours for 2 total doses at the onset of cold sore symptoms, Disp: 20 tablet, Rfl: 1  No Known Allergies    EXAM:   /84 (BP Location: Right arm, Patient Position: Sitting, Cuff Size: Adult Regular)   Pulse 73   Ht 1.829 m (6')   Wt 77.9 kg (171 lb 12.8 oz)   SpO2 96%   BMI 23.30 kg/m    Physical Exam  Vitals and nursing note reviewed.   Constitutional:       Appearance: Normal appearance.   HENT:      Head: Normocephalic and atraumatic.      Right Ear: External ear normal.      Left Ear: External ear normal.      Nose: No mucosal edema, congestion or rhinorrhea.      Mouth/Throat:      Mouth: Mucous membranes are moist. No oral lesions.      Pharynx: Oropharynx is clear. Uvula midline. No posterior oropharyngeal erythema.   Eyes:      General: Lids are normal.      Extraocular Movements: Extraocular movements intact.      Conjunctiva/sclera: Conjunctivae normal.   Neck:      Comments: No asymmetry, masses, or scars  Cardiovascular:      Rate and Rhythm: Normal rate and regular rhythm.      Heart sounds: S1 normal and S2 normal. No murmur heard.  Pulmonary:      Effort: Pulmonary effort is normal. No respiratory distress.      Breath sounds: Normal breath sounds and air entry.   Musculoskeletal:      Comments: No musculoskeletal defects noted   Skin:     General: Skin is warm and dry.      Findings: No lesion or rash.   Neurological:      General: No focal deficit present.      Mental Status: He is alert.   Psychiatric:         Mood and Affect: Mood and affect normal.            WORKUP:  Spirometry reviewed and is normal      ASSESSMENT/PLAN:  Moreno Elena is a 54 year old male here for a follow-up visit.    1. Mild persistent asthma without complication - Controlled. No exacerbations or oral steroid use in the past year. Does report return of asthma symptoms if he misses a day or two of his maintenance medication so will not plan to wean medication at this time.    - Adult Allergy Clinic Follow-Up Order  - Spirometry, Breathing Capacity: Normal Order, Clinic Performed  - Pulmonary Function Test  - fluticasone-salmeterol (ADVAIR DISKUS) 100-50 MCG/ACT inhaler; Inhale 1 puff into the lungs every 12 hours.  Dispense: 3 each; Refill: 3      Follow-up in 1 year, sooner if needed      Thank you for allowing me to participate in the care of Moreno Elena.      Claribel Smith MD, FAAAAI  Allergy/Immunology  [facility]      Consent was obtained from the patient to use an AI documentation tool in the creation of this note.    Chart documentation done in part with Dragon Voice Recognition Software. Although reviewed after completion, some word and grammatical errors may remain.